# Patient Record
Sex: MALE | Race: WHITE | NOT HISPANIC OR LATINO | Employment: FULL TIME | URBAN - METROPOLITAN AREA
[De-identification: names, ages, dates, MRNs, and addresses within clinical notes are randomized per-mention and may not be internally consistent; named-entity substitution may affect disease eponyms.]

---

## 2017-02-24 ENCOUNTER — TELEPHONE (OUTPATIENT)
Dept: ORTHOPEDICS | Facility: CLINIC | Age: 42
End: 2017-02-24

## 2017-02-24 NOTE — TELEPHONE ENCOUNTER
----- Message from Zoila Serrano sent at 2/24/2017 10:21 AM CST -----  Contact: Self  X   1st Request  _  2nd Request  _  3rd Request        Who: TREMAYNE SHER [[id number]]    Why: Pt broke his index finger on the rt hand and went to Urgent Care. Pt feels the need to be seen today. Please call, thanks!    What Number to Call Back:  [phone]    When to Expect a call back: (Before the end of the day)   -- if the call is after 12:00, the call back will be tomorrow.      l

## 2020-07-04 ENCOUNTER — HOSPITAL ENCOUNTER (EMERGENCY)
Facility: HOSPITAL | Age: 45
Discharge: HOME | End: 2020-07-04
Attending: EMERGENCY MEDICINE
Payer: COMMERCIAL

## 2020-07-04 VITALS
TEMPERATURE: 99 F | HEIGHT: 70 IN | BODY MASS INDEX: 32.93 KG/M2 | HEART RATE: 70 BPM | RESPIRATION RATE: 20 BRPM | SYSTOLIC BLOOD PRESSURE: 128 MMHG | WEIGHT: 230 LBS | OXYGEN SATURATION: 100 % | DIASTOLIC BLOOD PRESSURE: 82 MMHG

## 2020-07-04 DIAGNOSIS — R07.89 CHEST WALL PAIN: ICD-10-CM

## 2020-07-04 DIAGNOSIS — R07.9 CHEST PAIN: Primary | ICD-10-CM

## 2020-07-04 LAB
ALBUMIN SERPL BCP-MCNC: 3.9 G/DL (ref 3.5–5.2)
ALP SERPL-CCNC: 86 U/L (ref 55–135)
ALT SERPL W/O P-5'-P-CCNC: 20 U/L (ref 10–44)
ANION GAP SERPL CALC-SCNC: 11 MMOL/L (ref 8–16)
AST SERPL-CCNC: 21 U/L (ref 10–40)
BASOPHILS # BLD AUTO: 0.04 K/UL (ref 0–0.2)
BASOPHILS NFR BLD: 0.7 % (ref 0–1.9)
BILIRUB SERPL-MCNC: 1.1 MG/DL (ref 0.1–1)
BNP SERPL-MCNC: 19 PG/ML (ref 0–99)
BUN SERPL-MCNC: 10 MG/DL (ref 6–20)
CALCIUM SERPL-MCNC: 8.7 MG/DL (ref 8.7–10.5)
CHLORIDE SERPL-SCNC: 107 MMOL/L (ref 95–110)
CO2 SERPL-SCNC: 24 MMOL/L (ref 23–29)
CREAT SERPL-MCNC: 0.8 MG/DL (ref 0.5–1.4)
DIFFERENTIAL METHOD: ABNORMAL
EOSINOPHIL # BLD AUTO: 0.1 K/UL (ref 0–0.5)
EOSINOPHIL NFR BLD: 1.1 % (ref 0–8)
ERYTHROCYTE [DISTWIDTH] IN BLOOD BY AUTOMATED COUNT: 12.9 % (ref 11.5–14.5)
EST. GFR  (AFRICAN AMERICAN): >60 ML/MIN/1.73 M^2
EST. GFR  (NON AFRICAN AMERICAN): >60 ML/MIN/1.73 M^2
GLUCOSE SERPL-MCNC: 87 MG/DL (ref 70–110)
HCT VFR BLD AUTO: 38.9 % (ref 40–54)
HGB BLD-MCNC: 13.3 G/DL (ref 14–18)
IMM GRANULOCYTES # BLD AUTO: 0.02 K/UL (ref 0–0.04)
IMM GRANULOCYTES NFR BLD AUTO: 0.3 % (ref 0–0.5)
INR PPP: 1.1
LYMPHOCYTES # BLD AUTO: 1.3 K/UL (ref 1–4.8)
LYMPHOCYTES NFR BLD: 21.2 % (ref 18–48)
MCH RBC QN AUTO: 29 PG (ref 27–31)
MCHC RBC AUTO-ENTMCNC: 34.2 G/DL (ref 32–36)
MCV RBC AUTO: 85 FL (ref 82–98)
MONOCYTES # BLD AUTO: 0.5 K/UL (ref 0.3–1)
MONOCYTES NFR BLD: 8 % (ref 4–15)
NEUTROPHILS # BLD AUTO: 4.2 K/UL (ref 1.8–7.7)
NEUTROPHILS NFR BLD: 68.7 % (ref 38–73)
NRBC BLD-RTO: 0 /100 WBC
PLATELET # BLD AUTO: 190 K/UL (ref 150–350)
PMV BLD AUTO: 11.2 FL (ref 9.2–12.9)
POTASSIUM SERPL-SCNC: 3.6 MMOL/L (ref 3.5–5.1)
PROT SERPL-MCNC: 6.5 G/DL (ref 6–8.4)
PROTHROMBIN TIME: 13.9 SEC (ref 10.6–14.8)
RBC # BLD AUTO: 4.59 M/UL (ref 4.6–6.2)
SODIUM SERPL-SCNC: 142 MMOL/L (ref 136–145)
TROPONIN I SERPL DL<=0.01 NG/ML-MCNC: <0.03 NG/ML
WBC # BLD AUTO: 6.13 K/UL (ref 3.9–12.7)

## 2020-07-04 PROCEDURE — 99285 EMERGENCY DEPT VISIT HI MDM: CPT | Mod: 25

## 2020-07-04 PROCEDURE — 84484 ASSAY OF TROPONIN QUANT: CPT

## 2020-07-04 PROCEDURE — 25000003 PHARM REV CODE 250: Performed by: EMERGENCY MEDICINE

## 2020-07-04 PROCEDURE — 63600175 PHARM REV CODE 636 W HCPCS: Performed by: EMERGENCY MEDICINE

## 2020-07-04 PROCEDURE — 96374 THER/PROPH/DIAG INJ IV PUSH: CPT

## 2020-07-04 PROCEDURE — 85025 COMPLETE CBC W/AUTO DIFF WBC: CPT

## 2020-07-04 PROCEDURE — 85610 PROTHROMBIN TIME: CPT

## 2020-07-04 PROCEDURE — 83880 ASSAY OF NATRIURETIC PEPTIDE: CPT

## 2020-07-04 PROCEDURE — 80053 COMPREHEN METABOLIC PANEL: CPT

## 2020-07-04 PROCEDURE — 93005 ELECTROCARDIOGRAM TRACING: CPT | Performed by: INTERNAL MEDICINE

## 2020-07-04 RX ORDER — METHOCARBAMOL 500 MG/1
1000 TABLET, FILM COATED ORAL
Status: COMPLETED | OUTPATIENT
Start: 2020-07-04 | End: 2020-07-04

## 2020-07-04 RX ORDER — METHOCARBAMOL 500 MG/1
1000 TABLET, FILM COATED ORAL 3 TIMES DAILY
Qty: 30 TABLET | Refills: 0 | Status: SHIPPED | OUTPATIENT
Start: 2020-07-04 | End: 2020-07-09

## 2020-07-04 RX ORDER — IBUPROFEN 600 MG/1
600 TABLET ORAL EVERY 6 HOURS PRN
Qty: 20 TABLET | Refills: 0 | Status: SHIPPED | OUTPATIENT
Start: 2020-07-04 | End: 2022-05-09

## 2020-07-04 RX ORDER — KETOROLAC TROMETHAMINE 30 MG/ML
15 INJECTION, SOLUTION INTRAMUSCULAR; INTRAVENOUS
Status: COMPLETED | OUTPATIENT
Start: 2020-07-04 | End: 2020-07-04

## 2020-07-04 RX ADMIN — KETOROLAC TROMETHAMINE 15 MG: 30 INJECTION, SOLUTION INTRAMUSCULAR at 07:07

## 2020-07-04 RX ADMIN — METHOCARBAMOL TABLETS 1000 MG: 500 TABLET, COATED ORAL at 07:07

## 2020-07-04 NOTE — ED PROVIDER NOTES
Encounter Date: 7/4/2020       History     Chief Complaint   Patient presents with    Chest Pain     THIS AM     45-year-old male past medical history significant of GERD presents with chest pain x1 day.  Patient states on last night he had a few drinks and stumbled landing on his left shoulder.  He states after they event around 2:00 a.m. he woke up with severe chest pain located on the left side of his chest wall with no radiation.  Patient describes the pain as crampy in character and exacerbated by certain movements as well as tender to touch.  Patient denies any nausea, vomiting, shortness of breath, fever, chills, sweats.  He is otherwise stable and has no other complaints.        Review of patient's allergies indicates:  No Known Allergies  Past Medical History:   Diagnosis Date    GERD (gastroesophageal reflux disease)      Past Surgical History:   Procedure Laterality Date    FOOT FRACTURE SURGERY      grastric sleeve      iye       UMBILICAL HERNIA REPAIR  2007    incarcerated, at [facility]     Family History   Problem Relation Age of Onset    Diabetes Father     Heart disease Father         minor MI    Cancer Maternal Grandfather         throat    Urticaria Brother      Social History     Tobacco Use    Smoking status: Current Some Day Smoker     Packs/day: 0.30     Years: 22.00     Pack years: 6.60     Types: Cigarettes   Substance Use Topics    Alcohol use: Yes     Alcohol/week: 15.0 standard drinks     Types: 15 Cans of beer per week    Drug use: No     Review of Systems   Constitutional: Negative for fever.   HENT: Negative for sore throat.    Respiratory: Negative for shortness of breath.    Cardiovascular: Positive for chest pain.   Gastrointestinal: Negative for nausea.   Genitourinary: Negative for dysuria.   Musculoskeletal: Negative for back pain.   Skin: Negative for rash.   Neurological: Negative for weakness.   Hematological: Does not bruise/bleed easily.   All other systems  reviewed and are negative.      Physical Exam     Initial Vitals [07/04/20 0702]   BP Pulse Resp Temp SpO2   124/75 75 17 99 °F (37.2 °C) 99 %      MAP       --         Physical Exam    Nursing note and vitals reviewed.  Constitutional: He appears well-developed and well-nourished. He is not diaphoretic. No distress.   HENT:   Head: Normocephalic and atraumatic.   Mouth/Throat: Oropharynx is clear and moist.   Eyes: Conjunctivae and EOM are normal. Pupils are equal, round, and reactive to light.   Neck: Normal range of motion. Neck supple. No thyromegaly present. No JVD present.   Cardiovascular: Normal rate, regular rhythm and normal heart sounds. Exam reveals no gallop and no friction rub.    No murmur heard.  Pulmonary/Chest: Breath sounds normal. No respiratory distress. He has no wheezes. He exhibits tenderness.   Abdominal: Soft. Bowel sounds are normal. He exhibits no distension. There is no abdominal tenderness. There is no rebound and no guarding.   Musculoskeletal: Normal range of motion. No tenderness or edema.   Neurological: He is alert and oriented to person, place, and time. He has normal strength. No cranial nerve deficit or sensory deficit.   Skin: Skin is warm and dry. Capillary refill takes less than 2 seconds. No rash noted. No erythema.   Psychiatric: He has a normal mood and affect.         ED Course   Procedures  Labs Reviewed   CBC W/ AUTO DIFFERENTIAL - Abnormal; Notable for the following components:       Result Value    RBC 4.59 (*)     Hemoglobin 13.3 (*)     Hematocrit 38.9 (*)     All other components within normal limits   COMPREHENSIVE METABOLIC PANEL - Abnormal; Notable for the following components:    Total Bilirubin 1.1 (*)     All other components within normal limits   B-TYPE NATRIURETIC PEPTIDE   TROPONIN I   PROTIME-INR        ECG Results          EKG 12-lead (In process)  Result time 07/04/20 08:04:34    In process by Interface, Lab In [facility] (07/04/20 08:04:34)                  Narrative:    Test Reason : R07.9,    Vent. Rate : 075 BPM     Atrial Rate : 075 BPM     P-R Int : 156 ms          QRS Dur : 102 ms      QT Int : 406 ms       P-R-T Axes : 046 026 012 degrees     QTc Int : 453 ms    Program found technically poor ECG  Normal sinus rhythm with sinus arrhythmia  Normal ECG  No previous ECGs available    Referred By: [email or web address]   SELF           Confirmed By:                             Imaging Results          X-Ray Chest AP Portable (Final result)  Result time 07/04/20 07:37:11    Final result by Petr Trammell MD (07/04/20 07:37:11)                 Narrative:    CLINICAL HISTORY:  45 years (1975) Male CHEST PAIN Chest Pain?(THIS AM)    TECHNIQUE:  Portable AP radiograph the chest.    COMPARISON:  None available.    FINDINGS:  The lungs are clear. Costophrenic angles are seen without effusion. No  pneumothorax is identified. The heart is top normal in size. The  mediastinum is within normal limits. Osseous structures show  degenerative changes in the spine. The upper abdomen shows a small  size hiatal hernia.    IMPRESSION:  No acute cardiac or pulmonary process.                  .            Electronically Signed by WM Bates on 7/4/2020 7:36 AM                               Medical Decision Making:   Initial Assessment:   [age] year male initial assessment in no acute distress at this time.  Patient is alert oriented x3, neurologically and neurovascular intact no focal deficits.  He is nontoxic appearing vital stable at this time.  Differential Diagnosis:   MI, GERD, MSK chest wall pain, pericarditis, pneumonia  Independently Interpreted Test(s):   I have ordered and independently interpreted X-rays - see prior notes.  I have ordered and independently interpreted EKG Reading(s) - see prior notes  Clinical Tests:   Lab Tests: Ordered and Reviewed  The following lab test(s) were unremarkable: CBC, CMP and Troponin  Radiological Study: Reviewed and  Ordered  Medical Tests: Ordered and Reviewed  ED Management:  Patient has been reassessed noted to have no acute changes condition.  Labs images unremarkable for any acute pathology requiring further hospital admission or treatment at this time.  He has remained stable while in the ED and discharged home stable condition with PCP follow-up as needed.  Mr. Richards is aware of the plan and in agreement with discharge.    Additional MDM:   Heart Score:    History:          Slightly suspicious.  ECG:             Normal  Age:               45-65 years  Risk factors: 1-2 risk factors  Troponin:       Less than or equal to normal limit  Final Score: 2                                  Clinical Impression:       ICD-10-CM ICD-9-CM   1. Chest pain  R07.9 786.50   2. Chest wall pain  R07.89 786.52             ED Disposition Condition    Discharge Stable        ED Prescriptions     Medication Sig Dispense Start Date End Date Auth. Provider    methocarbamoL (ROBAXIN) 500 MG Tab Take 2 tablets (1,000 mg total) by mouth 3 (three) times daily. for 5 days 30 tablet 7/4/2020 7/9/2020 Zay Spencer MD    ibuprofen (ADVIL,MOTRIN) 600 MG tablet Take 1 tablet (600 mg total) by mouth every 6 (six) hours as needed. 20 tablet 7/4/2020  Zay Spencer MD        Follow-up Information     Follow up With Specialties Details Why Contact Info Additional Information    [facility] Emergency Medicine  As needed, If symptoms worsen [address]  [facility] [id number] [phone] 1st floor    Ant Maier MD Internal Medicine Schedule an appointment as soon as possible for a visit in 3 days As needed [address]  [address]  [facility] [id number] [phone]                                        Zay Spencer MD  07/04/20 0996

## 2021-04-16 ENCOUNTER — PATIENT MESSAGE (OUTPATIENT)
Dept: RESEARCH | Facility: HOSPITAL | Age: 46
End: 2021-04-16

## 2021-10-27 PROBLEM — Z98.84 S/P BARIATRIC SURGERY: Status: ACTIVE | Noted: 2018-07-17

## 2021-10-27 PROBLEM — Z83.3 FAMILY HISTORY OF DIABETES MELLITUS: Status: ACTIVE | Noted: 2018-07-20

## 2021-11-04 ENCOUNTER — OFFICE VISIT (OUTPATIENT)
Dept: UROLOGY | Facility: CLINIC | Age: 46
End: 2021-11-04
Payer: COMMERCIAL

## 2021-11-04 VITALS
BODY MASS INDEX: 33.21 KG/M2 | SYSTOLIC BLOOD PRESSURE: 132 MMHG | DIASTOLIC BLOOD PRESSURE: 84 MMHG | HEIGHT: 70 IN | HEART RATE: 79 BPM | WEIGHT: 231.94 LBS

## 2021-11-04 DIAGNOSIS — Z30.09 VASECTOMY EVALUATION: ICD-10-CM

## 2021-11-04 PROCEDURE — 3079F PR MOST RECENT DIASTOLIC BLOOD PRESSURE 80-89 MM HG: ICD-10-PCS | Mod: CPTII,S$GLB,, | Performed by: UROLOGY

## 2021-11-04 PROCEDURE — 3079F DIAST BP 80-89 MM HG: CPT | Mod: CPTII,S$GLB,, | Performed by: UROLOGY

## 2021-11-04 PROCEDURE — 1159F MED LIST DOCD IN RCRD: CPT | Mod: CPTII,S$GLB,, | Performed by: UROLOGY

## 2021-11-04 PROCEDURE — 3075F SYST BP GE 130 - 139MM HG: CPT | Mod: CPTII,S$GLB,, | Performed by: UROLOGY

## 2021-11-04 PROCEDURE — 99999 PR PBB SHADOW E&M-EST. PATIENT-LVL III: ICD-10-PCS | Mod: PBBFAC,,, | Performed by: UROLOGY

## 2021-11-04 PROCEDURE — 99204 OFFICE O/P NEW MOD 45 MIN: CPT | Mod: S$GLB,,, | Performed by: UROLOGY

## 2021-11-04 PROCEDURE — 3075F PR MOST RECENT SYSTOLIC BLOOD PRESS GE 130-139MM HG: ICD-10-PCS | Mod: CPTII,S$GLB,, | Performed by: UROLOGY

## 2021-11-04 PROCEDURE — 99999 PR PBB SHADOW E&M-EST. PATIENT-LVL III: CPT | Mod: PBBFAC,,, | Performed by: UROLOGY

## 2021-11-04 PROCEDURE — 1159F PR MEDICATION LIST DOCUMENTED IN MEDICAL RECORD: ICD-10-PCS | Mod: CPTII,S$GLB,, | Performed by: UROLOGY

## 2021-11-04 PROCEDURE — 3008F PR BODY MASS INDEX (BMI) DOCUMENTED: ICD-10-PCS | Mod: CPTII,S$GLB,, | Performed by: UROLOGY

## 2021-11-04 PROCEDURE — 99204 PR OFFICE/OUTPT VISIT, NEW, LEVL IV, 45-59 MIN: ICD-10-PCS | Mod: S$GLB,,, | Performed by: UROLOGY

## 2021-11-04 PROCEDURE — 3008F BODY MASS INDEX DOCD: CPT | Mod: CPTII,S$GLB,, | Performed by: UROLOGY

## 2021-11-04 RX ORDER — DIAZEPAM 10 MG/1
TABLET ORAL
Qty: 2 TABLET | Refills: 0 | Status: SHIPPED | OUTPATIENT
Start: 2021-11-04 | End: 2022-02-02

## 2021-12-21 ENCOUNTER — PROCEDURE VISIT (OUTPATIENT)
Dept: UROLOGY | Facility: CLINIC | Age: 46
End: 2021-12-21
Payer: COMMERCIAL

## 2021-12-21 VITALS
WEIGHT: 225 LBS | TEMPERATURE: 98 F | HEIGHT: 70 IN | DIASTOLIC BLOOD PRESSURE: 87 MMHG | SYSTOLIC BLOOD PRESSURE: 135 MMHG | BODY MASS INDEX: 32.21 KG/M2 | HEART RATE: 82 BPM | RESPIRATION RATE: 16 BRPM

## 2021-12-21 DIAGNOSIS — Z30.09 VASECTOMY EVALUATION: ICD-10-CM

## 2021-12-21 RX ORDER — LIDOCAINE HYDROCHLORIDE 10 MG/ML
20 INJECTION INFILTRATION; PERINEURAL
Status: COMPLETED | OUTPATIENT
Start: 2021-12-21 | End: 2021-12-21

## 2021-12-21 RX ADMIN — LIDOCAINE HYDROCHLORIDE 20 ML: 10 INJECTION INFILTRATION; PERINEURAL at 02:12

## 2021-12-21 NOTE — PROCEDURES
Procedures     Preop Diagnosis: undesired fertility    Post Procedure Diagnosed: same    Procedure: bilateral vasectomy    Surgeon: Dr. Sree Roque MD    Anesthesia: 4cc of 1% plain zylocaine    Procedure in detail: After informed consent was obtained, the patient was shaved, prepped and draped in a sterile fashion. He was in the supine position. The left vas was identified and rotated to right of the mid-line. Infiltration of 2cc of zylocaine was made into the skin subcutaneous tissue, vas and per-vasal tissues. A stab wound was made. A 2.5cm segment was delivered to the wound, excised, fulgrated and doubly ligated and folded back upon itself and buried separately. A similar procedure was performed on the right side through the same incision. A 3.0 chromic was used throughout the procedure and to close the skin in an interupted fashion.     Blood loss was minimal.   Usual post vas precautions were given.   Prescriptions for pain meds given.     No sex or aspirin for the first week.   15-20 ejaculation with protection over the next 6 weeks and patient will bring a post vas specimen in 6 weeks or call sooner prn.

## 2021-12-21 NOTE — PATIENT INSTRUCTIONS
What to Expect After a Vasectomy  You cannot drive or operate heavy machinery on the day of the procedure.    Apply ice packs to the scrotal area for 24-48 hours. Avoid direct contact of the ice pack with the skin. Scrotal supports, jock straps, or fitted underwear help elevate the scrotum and reduce discomfort.    You may shower the next day. Gently apply soapy water to the scrotum to wash. Rinse and dry yourself by blotting the skin, not rubbing.    Avoid strenuous physical exercises or sexual relations for at least one week after a vasectomy.    Continue to use birth control for at least 6 weeks or 10-20 ejaculations. You are still considered fertile until your urologist examines a post-vasectomy semen analysis at 6 weeks and perhaps one at 8 weeks as well. Drop off the specimen at the 4th floor [facility] between 8am and 4pm.    Do NOT resume unprotected sexual activity until your physician finds no sperm in your semen.    All stitches will dissolve on their own in 1-2 weeks.    Signs and Symptoms to Report  · A large amount of bleeding at the site  · An unusual amount of pain  · A large amount of swelling in the scrotum  · Fever and chills  · Any signs of infection, such as redness at the site or foul-smelling drainage    Risks  The risks of complication after vasectomy are very low. A few of the risks include:  · Bleeding  · Infection  · Scrotal hematoma - a collection of blood in the scrotum  · Inflammation of the epididymis - inflammation of a structure next to the testicle that helps in maturation of sperm  · Sperm granuloma - a collection of sperm that leaks out from the vas deferens, forming a small nodule or lump. This does not usually cause any discomfort but you may feel it in the scrotum.  · Recanalization - the restoration of the lumen or transport tube between the two ends of the vas deferens, possibly causing fertility    If you have any questions or concerns, please call your [facility]  urologist at [phone].

## 2021-12-24 ENCOUNTER — HOSPITAL ENCOUNTER (EMERGENCY)
Facility: OTHER | Age: 46
Discharge: HOME | End: 2021-12-24
Attending: EMERGENCY MEDICINE
Payer: COMMERCIAL

## 2021-12-24 ENCOUNTER — PATIENT MESSAGE (OUTPATIENT)
Dept: ADMINISTRATIVE | Facility: OTHER | Age: 46
End: 2021-12-24
Payer: COMMERCIAL

## 2021-12-24 ENCOUNTER — NURSE TRIAGE (OUTPATIENT)
Dept: ADMINISTRATIVE | Facility: CLINIC | Age: 46
End: 2021-12-24
Payer: COMMERCIAL

## 2021-12-24 VITALS
DIASTOLIC BLOOD PRESSURE: 105 MMHG | WEIGHT: 227.5 LBS | TEMPERATURE: 100 F | RESPIRATION RATE: 20 BRPM | BODY MASS INDEX: 31.85 KG/M2 | HEIGHT: 71 IN | OXYGEN SATURATION: 100 % | SYSTOLIC BLOOD PRESSURE: 171 MMHG | HEART RATE: 92 BPM

## 2021-12-24 DIAGNOSIS — A41.9 SEPSIS: ICD-10-CM

## 2021-12-24 DIAGNOSIS — Z98.890 POST-OPERATIVE STATE: ICD-10-CM

## 2021-12-24 DIAGNOSIS — R50.9 FEVER, UNSPECIFIED FEVER CAUSE: Primary | ICD-10-CM

## 2021-12-24 LAB
ALBUMIN SERPL BCP-MCNC: 3.9 G/DL (ref 3.5–5.2)
ALP SERPL-CCNC: 94 U/L (ref 55–135)
ALT SERPL W/O P-5'-P-CCNC: 11 U/L (ref 10–44)
ANION GAP SERPL CALC-SCNC: 12 MMOL/L (ref 8–16)
AST SERPL-CCNC: 18 U/L (ref 10–40)
BASOPHILS # BLD AUTO: 0.03 K/UL (ref 0–0.2)
BASOPHILS NFR BLD: 0.4 % (ref 0–1.9)
BILIRUB SERPL-MCNC: 0.6 MG/DL (ref 0.1–1)
BILIRUB UR QL STRIP: NEGATIVE
BUN SERPL-MCNC: 7 MG/DL (ref 6–20)
CALCIUM SERPL-MCNC: 9.2 MG/DL (ref 8.7–10.5)
CHLORIDE SERPL-SCNC: 103 MMOL/L (ref 95–110)
CLARITY UR: CLEAR
CO2 SERPL-SCNC: 26 MMOL/L (ref 23–29)
COLOR UR: YELLOW
CREAT SERPL-MCNC: 1 MG/DL (ref 0.5–1.4)
CTP QC/QA: YES
CTP QC/QA: YES
DIFFERENTIAL METHOD: ABNORMAL
EOSINOPHIL # BLD AUTO: 0 K/UL (ref 0–0.5)
EOSINOPHIL NFR BLD: 0.3 % (ref 0–8)
ERYTHROCYTE [DISTWIDTH] IN BLOOD BY AUTOMATED COUNT: 12.2 % (ref 11.5–14.5)
EST. GFR  (AFRICAN AMERICAN): >60 ML/MIN/1.73 M^2
EST. GFR  (NON AFRICAN AMERICAN): >60 ML/MIN/1.73 M^2
GLUCOSE SERPL-MCNC: 91 MG/DL (ref 70–110)
GLUCOSE UR QL STRIP: NEGATIVE
HCT VFR BLD AUTO: 41 % (ref 40–54)
HGB BLD-MCNC: 14 G/DL (ref 14–18)
HGB UR QL STRIP: NEGATIVE
IMM GRANULOCYTES # BLD AUTO: 0.02 K/UL (ref 0–0.04)
IMM GRANULOCYTES NFR BLD AUTO: 0.3 % (ref 0–0.5)
KETONES UR QL STRIP: NEGATIVE
LACTATE SERPL-SCNC: 0.9 MMOL/L (ref 0.5–2.2)
LEUKOCYTE ESTERASE UR QL STRIP: NEGATIVE
LYMPHOCYTES # BLD AUTO: 1 K/UL (ref 1–4.8)
LYMPHOCYTES NFR BLD: 13.9 % (ref 18–48)
MCH RBC QN AUTO: 29.2 PG (ref 27–31)
MCHC RBC AUTO-ENTMCNC: 34.1 G/DL (ref 32–36)
MCV RBC AUTO: 85 FL (ref 82–98)
MONOCYTES # BLD AUTO: 0.8 K/UL (ref 0.3–1)
MONOCYTES NFR BLD: 11 % (ref 4–15)
NEUTROPHILS # BLD AUTO: 5.5 K/UL (ref 1.8–7.7)
NEUTROPHILS NFR BLD: 74.1 % (ref 38–73)
NITRITE UR QL STRIP: NEGATIVE
NRBC BLD-RTO: 0 /100 WBC
PH UR STRIP: 6 [PH] (ref 5–8)
PLATELET # BLD AUTO: 177 K/UL (ref 150–450)
PMV BLD AUTO: 10.3 FL (ref 9.2–12.9)
POC MOLECULAR INFLUENZA A AGN: NEGATIVE
POC MOLECULAR INFLUENZA B AGN: NEGATIVE
POTASSIUM SERPL-SCNC: 3.7 MMOL/L (ref 3.5–5.1)
PROT SERPL-MCNC: 6.8 G/DL (ref 6–8.4)
PROT UR QL STRIP: NEGATIVE
RBC # BLD AUTO: 4.8 M/UL (ref 4.6–6.2)
SARS-COV-2 RDRP RESP QL NAA+PROBE: NEGATIVE
SODIUM SERPL-SCNC: 141 MMOL/L (ref 136–145)
SP GR UR STRIP: <=1.005 (ref 1–1.03)
URN SPEC COLLECT METH UR: ABNORMAL
UROBILINOGEN UR STRIP-ACNC: NEGATIVE EU/DL
WBC # BLD AUTO: 7.36 K/UL (ref 3.9–12.7)

## 2021-12-24 PROCEDURE — 99285 EMERGENCY DEPT VISIT HI MDM: CPT | Mod: 25

## 2021-12-24 PROCEDURE — 25000003 PHARM REV CODE 250: Performed by: EMERGENCY MEDICINE

## 2021-12-24 PROCEDURE — 80053 COMPREHEN METABOLIC PANEL: CPT | Performed by: EMERGENCY MEDICINE

## 2021-12-24 PROCEDURE — 93005 ELECTROCARDIOGRAM TRACING: CPT

## 2021-12-24 PROCEDURE — 63600175 PHARM REV CODE 636 W HCPCS: Performed by: EMERGENCY MEDICINE

## 2021-12-24 PROCEDURE — 85025 COMPLETE CBC W/AUTO DIFF WBC: CPT | Performed by: EMERGENCY MEDICINE

## 2021-12-24 PROCEDURE — 81003 URINALYSIS AUTO W/O SCOPE: CPT | Performed by: EMERGENCY MEDICINE

## 2021-12-24 PROCEDURE — U0002 COVID-19 LAB TEST NON-CDC: HCPCS | Performed by: EMERGENCY MEDICINE

## 2021-12-24 PROCEDURE — 96374 THER/PROPH/DIAG INJ IV PUSH: CPT

## 2021-12-24 PROCEDURE — 83605 ASSAY OF LACTIC ACID: CPT | Performed by: EMERGENCY MEDICINE

## 2021-12-24 PROCEDURE — 93010 ELECTROCARDIOGRAM REPORT: CPT | Mod: ,,, | Performed by: INTERNAL MEDICINE

## 2021-12-24 PROCEDURE — 93010 EKG 12-LEAD: ICD-10-PCS | Mod: ,,, | Performed by: INTERNAL MEDICINE

## 2021-12-24 RX ORDER — ACETAMINOPHEN 500 MG
1000 TABLET ORAL
Status: COMPLETED | OUTPATIENT
Start: 2021-12-24 | End: 2021-12-24

## 2021-12-24 RX ORDER — KETOROLAC TROMETHAMINE 30 MG/ML
15 INJECTION, SOLUTION INTRAMUSCULAR; INTRAVENOUS
Status: COMPLETED | OUTPATIENT
Start: 2021-12-24 | End: 2021-12-24

## 2021-12-24 RX ADMIN — ACETAMINOPHEN 1000 MG: 500 TABLET, FILM COATED ORAL at 09:12

## 2021-12-24 RX ADMIN — KETOROLAC TROMETHAMINE 15 MG: 30 INJECTION, SOLUTION INTRAMUSCULAR; INTRAVENOUS at 09:12

## 2021-12-25 NOTE — ED PROVIDER NOTES
"Encounter Date: 12/24/2021    SCRIBE #1 NOTE: I, Vandana Bailey, am scribing for, and in the presence of, Bell Sahni MD.       History     Chief Complaint   Patient presents with    Fever     Had a vasectomy 3 days ago, thinks he "overdid" it today. Was running around getting ready for X-mas. Has minor swelling, no drainage, and soreness, denies body aches, N/V/D, cough     Time seen by provider: 9:20 PM    This is a 46 y.o. male who presents with complaint of fever onset this evening. He was preparing for [place] today and thinks he may have "overdid" it. He measured his tempeture to be 102 F at home. Patient decided to come ER for evaluations because he was concerned for COVID-19. He has been vaccinated against COVID-19. patient had a vasectomy that was done by Dr. Roque last Tuesday. He followed up the post-op instructions. He notes "some" testicular pain. However, he has not had medication for pain management since noon today. He normally takes Ibuprofen 600 which helps with his post-up pain.     The history is provided by the patient.     Review of patient's allergies indicates:  No Known Allergies  Past Medical History:   Diagnosis Date    GERD (gastroesophageal reflux disease)      Past Surgical History:   Procedure Laterality Date    FOOT FRACTURE SURGERY      grastric sleeve      iye       UMBILICAL HERNIA REPAIR  2007    incarcerated, at [facility]     Family History   Problem Relation Age of Onset    Diabetes Father     Heart disease Father         minor MI    Cancer Maternal Grandfather         throat    Urticaria Brother      Social History     Tobacco Use    Smoking status: Current Some Day Smoker     Packs/day: 0.30     Years: 22.00     Pack years: 6.60     Types: Cigarettes    Smokeless tobacco: Never Used   Substance Use Topics    Alcohol use: Yes     Alcohol/week: 15.0 standard drinks     Types: 15 Cans of beer per week    Drug use: No     Review of Systems "   Constitutional: Positive for fever. Negative for chills.   HENT: Negative for congestion and sore throat.    Eyes: Negative for photophobia and redness.   Respiratory: Negative for cough and shortness of breath.    Cardiovascular: Negative for chest pain.   Gastrointestinal: Negative for abdominal pain, nausea and vomiting.   Genitourinary: Positive for testicular pain. Negative for dysuria.   Musculoskeletal: Negative for back pain.   Skin: Negative for rash.   Neurological: Negative for weakness, light-headedness and headaches.   Psychiatric/Behavioral: Negative for confusion.       Physical Exam     Initial Vitals [12/24/21 1919]   BP Pulse Resp Temp SpO2   (!) 171/105 92 20 (!) 101.9 °F (38.8 °C) 100 %      MAP       --         Physical Exam    Nursing note and vitals reviewed.  Constitutional: He appears well-developed. He is cooperative.   HENT:   Head: Atraumatic.   Eyes: Conjunctivae and lids are normal.   Neck:   Normal range of motion.  Cardiovascular: Normal rate.   Pulmonary/Chest: Breath sounds normal.   Abdominal: Abdomen is soft. There is no abdominal tenderness.   Genitourinary:    Penis normal.      Genitourinary Comments: Suture in place to right scrotum- no focal swelling, erythema, tenderness     Musculoskeletal:         General: Normal range of motion.      Cervical back: Normal range of motion.     Neurological: He is alert and oriented to person, place, and time.   Skin: No rash noted.   Psychiatric: He has a normal mood and affect. His speech is normal and behavior is normal.         ED Course   Procedures  Labs Reviewed   CBC W/ AUTO DIFFERENTIAL - Abnormal; Notable for the following components:       Result Value    Gran % 74.1 (*)     Lymph % 13.9 (*)     All other components within normal limits   URINALYSIS, REFLEX TO URINE CULTURE - Abnormal; Notable for the following components:    Specific Gravity, UA <=1.005 (*)     All other components within normal limits    Narrative:      Specimen Source->Urine   COMPREHENSIVE METABOLIC PANEL   LACTIC ACID, PLASMA   SARS-COV-2 RDRP GENE    Narrative:     This test utilizes isothermal nucleic acid amplification   technology to detect the SARS-CoV-2 RdRp nucleic acid segment.   The analytical sensitivity (limit of detection) is 125 genome   equivalents/mL.   A POSITIVE result implies infection with the SARS-CoV-2 virus;   the patient is presumed to be contagious.     A NEGATIVE result means that SARS-CoV-2 nucleic acids are not   present above the limit of detection. A NEGATIVE result should be   treated as presumptive. It does not rule out the possibility of   COVID-19 and should not be the sole basis for treatment decisions.   If COVID-19 is strongly suspected based on clinical and exposure   history, re-testing using an alternate molecular assay should be   considered.   This test is only for use under the Food and Drug   Administration s Emergency Use Authorization (EUA).   Commercial kits are provided by [employer].   Performance characteristics of the EUA have been independently   verified by [facility] Department of   Pathology and Laboratory Medicine.   _________________________________________________________________   The authorized Fact Sheet for Healthcare Providers and the authorized Fact   Sheet for Patients of the ID NOW COVID-19 are available on the FDA   website:     https://www.fda.gov/media/619863/download  https://www.fda.gov/media/034886/download         POCT INFLUENZA A/B MOLECULAR     EKG Readings: (Independently Interpreted)   Sinus arrhythmia. Rate of 84. Narrow QRS. No STEMI.        Imaging Results          X-Ray Chest AP Portable (Final result)  Result time 12/24/21 21:18:39    Final result by Rosemary Barnes MD (12/24/21 21:18:39)                 Impression:      No acute intrathoracic abnormality detected.      Electronically signed by: Rosemary Barnes  Date:    12/24/2021  Time:    21:18              Narrative:    EXAMINATION:  AP PORTABLE CHEST    CLINICAL HISTORY:  Sepsis;    TECHNIQUE:  AP portable chest radiograph was submitted.    COMPARISON:  07/04/2020    FINDINGS:  AP portable chest radiograph demonstrates a cardiac silhouette within normal limits.  There is no focal consolidation, pneumothorax, or pleural effusion.  There is mild lingular scarring or atelectasis.  Spondylitic changes are present                              X-Rays:   Independently Interpreted Readings:   Chest X-Ray: No focal infiltrate. No cardiomegaly. No pneumothorax.      Medications   acetaminophen tablet 1,000 mg (1,000 mg Oral Given 12/24/21 2115)   ketorolac injection 15 mg (15 mg Intravenous Given 12/24/21 2120)     Medical Decision Making:   History:   Old Medical Records: I decided to obtain old medical records.  Initial Assessment:   46-year-old gentleman here with concern for fever, developed this evening.  Patient is 3 days postop from vasectomy by Dr. Roque. Currently denies dysuria, mild dull pain to the testicles, no cough. Daughter has uri symptoms and patient is fully vaccinated against covid- no known close contacts. Here pt well appearing, non toxic, febrile 101.9- no hypoxia- gu exam without wound dehisence or scrotal tenderness. Initial sepsis workup held given isolated fever- covid neg. Will obtain labs, admin antipyretics, suggest repeat covid testing if fever persists.     Independently Interpreted Test(s):   I have ordered and independently interpreted X-rays - see prior notes.  I have ordered and independently interpreted EKG Reading(s) - see prior notes  Clinical Tests:   Lab Tests: Ordered and Reviewed  Radiological Study: Ordered and Reviewed  Medical Tests: Ordered and Reviewed          Scribe Attestation:   Scribe #1: I performed the above scribed service and the documentation accurately describes the services I performed. I attest to the accuracy of the note.         Physician Attestation for Scribe: I,  Bora, reviewed documentation as scribed in my presence, which is both accurate and complete.  ED Course as of 12/24/21 2359   Fri Dec 24, 2021   2044 CBC auto differential [RC]      ED Course User Index  [RC] Jorge Anaya MD             Clinical Impression:   Final diagnoses:  [A41.9] Sepsis  [R50.9] Fever, unspecified fever cause (Primary)  [Z98.890] Post-operative state          ED Disposition Condition    Discharge Stable        ED Prescriptions     None        Follow-up Information     Follow up With Specialties Details Why Contact Info    Sree Roque Jr., MD Urology Call in 2 days If symptoms worsen [address] [address]  [phone]             Bell Sahni MD  12/24/21 3447

## 2021-12-28 ENCOUNTER — PATIENT MESSAGE (OUTPATIENT)
Dept: UROLOGY | Facility: CLINIC | Age: 46
End: 2021-12-28
Payer: COMMERCIAL

## 2021-12-30 ENCOUNTER — HOSPITAL ENCOUNTER (EMERGENCY)
Facility: HOSPITAL | Age: 46
Discharge: HOME | End: 2021-12-30
Attending: EMERGENCY MEDICINE
Payer: COMMERCIAL

## 2021-12-30 ENCOUNTER — TELEPHONE (OUTPATIENT)
Dept: UROLOGY | Facility: CLINIC | Age: 46
End: 2021-12-30
Payer: COMMERCIAL

## 2021-12-30 VITALS
SYSTOLIC BLOOD PRESSURE: 143 MMHG | DIASTOLIC BLOOD PRESSURE: 94 MMHG | RESPIRATION RATE: 18 BRPM | OXYGEN SATURATION: 99 % | HEART RATE: 86 BPM | TEMPERATURE: 99 F

## 2021-12-30 DIAGNOSIS — N50.89 SWELLING OF LEFT TESTICLE: ICD-10-CM

## 2021-12-30 DIAGNOSIS — N50.812 PAIN IN LEFT TESTICLE: Primary | ICD-10-CM

## 2021-12-30 LAB
BILIRUB UR QL STRIP: NEGATIVE
CLARITY UR REFRACT.AUTO: CLEAR
COLOR UR AUTO: ABNORMAL
GLUCOSE UR QL STRIP: NEGATIVE
HGB UR QL STRIP: NEGATIVE
KETONES UR QL STRIP: ABNORMAL
LEUKOCYTE ESTERASE UR QL STRIP: NEGATIVE
NITRITE UR QL STRIP: NEGATIVE
PH UR STRIP: 6 [PH] (ref 5–8)
PROT UR QL STRIP: NEGATIVE
SP GR UR STRIP: 1 (ref 1–1.03)
URN SPEC COLLECT METH UR: ABNORMAL

## 2021-12-30 PROCEDURE — 99284 EMERGENCY DEPT VISIT MOD MDM: CPT | Mod: ,,, | Performed by: PHYSICIAN ASSISTANT

## 2021-12-30 PROCEDURE — 81003 URINALYSIS AUTO W/O SCOPE: CPT | Performed by: PHYSICIAN ASSISTANT

## 2021-12-30 PROCEDURE — 99284 PR EMERGENCY DEPT VISIT,LEVEL IV: ICD-10-PCS | Mod: ,,, | Performed by: PHYSICIAN ASSISTANT

## 2021-12-30 PROCEDURE — 25000003 PHARM REV CODE 250: Performed by: PHYSICIAN ASSISTANT

## 2021-12-30 PROCEDURE — 99284 EMERGENCY DEPT VISIT MOD MDM: CPT

## 2021-12-30 RX ORDER — ACETAMINOPHEN 500 MG
1000 TABLET ORAL
Status: COMPLETED | OUTPATIENT
Start: 2021-12-30 | End: 2021-12-30

## 2021-12-30 RX ADMIN — ACETAMINOPHEN 1000 MG: 500 TABLET ORAL at 02:12

## 2021-12-30 NOTE — ED NOTES
Patient identifiers verified and correct for Mr Richards  C/C: testicle pain and swelling SEE NN  APPEARANCE: awake and alert in NAD.  SKIN: warm, dry and intact. No breakdown or bruising.  MUSCULOSKELETAL: Patient moving all extremities spontaneously,testicle not observed at present. Ambulates independently.  RESPIRATORY: Denies shortness of breath.Respirations unlabored.   CARDIAC: Denies CP, 2+ distal pulses; no peripheral edema  ABDOMEN: S/ND/NT, Denies nausea  : voids spontaneously, denies difficulty  Neurologic: AAO x 4; follows commands equal strength in all extremities; denies numbness/tingling. Denies dizziness Denies weakness

## 2021-12-30 NOTE — ED NOTES
"Patient states left testicle swelling, noted at 0700 the size of "golf ball" currently increased in size. Denies fevers. Vasectomy 12/21, Ibuprofen at 0730  "

## 2021-12-30 NOTE — ED PROVIDER NOTES
Encounter Date: 12/30/2021       History     Chief Complaint   Patient presents with    Testicle Pain     Pt had a vasectomy on 12/21. L testicle is enlarged and painful.      46-year-old male with history of GERD presents to the ED complaining of left testicle pain and swelling.  He had a vasectomy on 12/21 with Dr Roque.  Yesterday, noticed some swelling to the left testicle that has been progressively worsening, significantly worsened this morning to the size of a golf ball.  He reports the pain has been worsening and is now a 5/10.  Denies any trauma.  He denies dysuria, hematuria, fever, chills, abdominal pain, chest pain.  He is not currently on antibiotics.    The history is provided by the patient.     Review of patient's allergies indicates:  No Known Allergies  Past Medical History:   Diagnosis Date    GERD (gastroesophageal reflux disease)      Past Surgical History:   Procedure Laterality Date    FOOT FRACTURE SURGERY      grastric sleeve      iye       UMBILICAL HERNIA REPAIR  2007    incarcerated, at [facility]     Family History   Problem Relation Age of Onset    Diabetes Father     Heart disease Father         minor MI    Cancer Maternal Grandfather         throat    Urticaria Brother      Social History     Tobacco Use    Smoking status: Current Some Day Smoker     Packs/day: 0.30     Years: 22.00     Pack years: 6.60     Types: Cigarettes    Smokeless tobacco: Never Used   Substance Use Topics    Alcohol use: Yes     Alcohol/week: 15.0 standard drinks     Types: 15 Cans of beer per week     Comment: Daily ETOH, lastnight    Drug use: No     Review of Systems   Constitutional: Negative for chills and fever.   HENT: Negative for congestion.    Respiratory: Negative for cough and shortness of breath.    Cardiovascular: Negative for chest pain.   Gastrointestinal: Negative for abdominal pain, nausea and vomiting.   Genitourinary: Positive for scrotal swelling and testicular pain.  Negative for dysuria, hematuria, penile discharge and penile pain.   Musculoskeletal: Negative for back pain.   Skin: Negative for rash.   Neurological: Negative for headaches.   Psychiatric/Behavioral: Negative for confusion.       Physical Exam     Initial Vitals   BP Pulse Resp Temp SpO2   12/30/21 1307 12/30/21 1306 12/30/21 1306 12/30/21 1306 12/30/21 1306   (!) 155/96 92 16 99.1 °F (37.3 °C) 99 %      MAP       --                Physical Exam    Constitutional: He appears well-developed and well-nourished. He is not diaphoretic. No distress.   HENT:   Head: Normocephalic and atraumatic.   Neck: Neck supple.   Normal range of motion.  Pulmonary/Chest: No respiratory distress.   Abdominal: Abdomen is soft. There is no abdominal tenderness. Hernia confirmed negative in the right inguinal area and confirmed negative in the left inguinal area. There is no rebound and no guarding.   Genitourinary:    Penis normal.   Right testis shows no mass and no tenderness. Left testis shows swelling and tenderness.    Genitourinary Comments: Chaperone present     Musculoskeletal:         General: Normal range of motion.      Cervical back: Normal range of motion and neck supple.     Neurological: He is alert and oriented to person, place, and time.   Skin: Skin is warm and dry. No rash noted. No erythema.   Psychiatric: He has a normal mood and affect.         ED Course   Procedures  Labs Reviewed   URINALYSIS, REFLEX TO URINE CULTURE - Abnormal; Notable for the following components:       Result Value    Ketones, UA Trace (*)     All other components within normal limits    Narrative:     Specimen Source->Urine          Imaging Results           US Scrotum And Testicles (Final result)  Result time 12/30/21 16:36:38    Final result by Ken El MD (12/30/21 16:36:38)                 Impression:      1. Bilateral edematous epididymal thickening and hyperemia, left greater than right.  Increased vascularity of the  bilateral testicles, left greater than right.  Findings are concerning for epididymo-orchitis noting that postoperative inflammation is likely a contributing factor in the setting of recent vasectomy.  Recommend clinical correlation.  2. Small left complex hydrocele with differential includes spermatocele, pyocele, and hematocele.  This report was flagged in Epic as abnormal.    Electronically signed by resident: Kayley Sanford  Date:    12/30/2021  Time:    16:15    Electronically signed by: Ken El  Date:    12/30/2021  Time:    16:36             Narrative:    EXAMINATION:  US SCROTUM AND TESTICLES    CLINICAL HISTORY:  Left testicular pain    TECHNIQUE:  Sonography of the scrotum and testes.    COMPARISON:  None.    FINDINGS:  Right Testicle:    *Size: 5.2 x 2.4 x 3.1 cm  *Appearance: Normal.  *Flow: Arterial and venous flow present.  Increased vascularity.  *Epididymis: Edematous epididymal thickening and hyperemia.  *Hydrocele: None.  *Varicocele: Present.  Left Testicle:    *Size: 4.7 x 2.4 x 3.2 cm  *Appearance: Normal.  *Flow: Arterial and venous flow present.  Increased vascularity.  *Epididymis: Edematous epididymal thickening and hyperemia.  *Hydrocele: Small complex hydrocele with internal echoes.  *Varicocele: Present.                                 Medications   acetaminophen tablet 1,000 mg (1,000 mg Oral Given 12/30/21 1418)     Medical Decision Making:   History:   Old Medical Records: I decided to obtain old medical records.  Old Records Summarized: records from previous admission(s) and records from clinic visits.       <> Summary of Records: 12/21: vasectomy with Dr Roque  12/24: ED visit, c/o fever. COVID negative  Clinical Tests:   Lab Tests: Ordered and Reviewed  Radiological Study: Ordered and Reviewed       APC / Resident Notes:   46-year-old male with history of GERD presents to the ED complaining of left testicle pain and swelling.  Vital signs stable.  Well-appearing.  There is  some swelling noted to the left testicle with minimal tenderness.  No erythema. Will get UA, ultrasound.    UA with no infection.    4:20PM  Signed out to DIANE Yu pending ultrasound.        Attending Attestation:     Physician Attestation Statement for NP/PA:   I discussed this assessment and plan of this patient with the NP/PA, but I did not personally examine the patient. The face to face encounter was performed by the NP/PA.    Other NP/PA Attestation Additions:      Medical Decision Making: Patient with vasectomy on 12/21 who presents with left sided testicular swelling x 1 day. Awaiting US and formal urology consult at time of sign out of care. Will need to be dispositioned based on urology's evaluation and recommendation given the recent operative procedure.   MIKEY Wilder MD                      Clinical Impression:   Final diagnoses:  [N50.812] Pain in left testicle  [N50.89] Swelling of left testicle                 Selina Larson PA-C  12/30/21 1645       Judy Wilder MD  01/14/22 2027

## 2021-12-31 NOTE — SUBJECTIVE & OBJECTIVE
Past Medical History:   Diagnosis Date    GERD (gastroesophageal reflux disease)        Past Surgical History:   Procedure Laterality Date    FOOT FRACTURE SURGERY      grastric sleeve      iye       UMBILICAL HERNIA REPAIR  2007    incarcerated, at [facility]       Review of patient's allergies indicates:  No Known Allergies    Family History     Problem Relation (Age of Onset)    Cancer Maternal Grandfather    Diabetes Father    Heart disease Father    Urticaria Brother          Tobacco Use    Smoking status: Current Some Day Smoker     Packs/day: 0.30     Years: 22.00     Pack years: 6.60     Types: Cigarettes    Smokeless tobacco: Never Used   Substance and Sexual Activity    Alcohol use: Yes     Alcohol/week: 15.0 standard drinks     Types: 15 Cans of beer per week     Comment: Daily ETOH, lastnight    Drug use: No    Sexual activity: Not on file       Review of Systems   Constitutional: Negative for appetite change, chills, fatigue, fever and unexpected weight change.   HENT: Negative.    Eyes: Negative.    Respiratory: Negative for cough, chest tightness and shortness of breath.    Cardiovascular: Negative for chest pain, palpitations and leg swelling.   Gastrointestinal: Negative for abdominal pain, constipation, diarrhea, nausea and vomiting.   Genitourinary: Positive for scrotal swelling. Negative for flank pain, frequency, hematuria, testicular pain and urgency.   Musculoskeletal: Negative for back pain.   Skin: Negative for rash.   Neurological: Negative for dizziness, syncope, numbness and headaches.   Hematological: Does not bruise/bleed easily.   Psychiatric/Behavioral: Negative.        Objective:     Temp:  [99.1 °F (37.3 °C)] 99.1 °F (37.3 °C)  Pulse:  [86-92] 86  Resp:  [16-18] 18  SpO2:  [99 %] 99 %  BP: (143-155)/(94-96) 143/94     There is no height or weight on file to calculate BMI.           Drains     None                 Physical Exam  Constitutional:       General: He is not in  acute distress.     Appearance: He is well-developed and well-nourished.   Cardiovascular:      Rate and Rhythm: Normal rate.      Pulses: Intact distal pulses.   Pulmonary:      Effort: Pulmonary effort is normal. No respiratory distress.   Abdominal:      General: There is no distension.      Palpations: Abdomen is soft.      Tenderness: There is no abdominal tenderness.   Musculoskeletal:         General: No tenderness. Normal range of motion.   Skin:     General: Skin is warm and dry.      Findings: No rash.   Neurological:      Mental Status: He is alert and oriented to person, place, and time.   Psychiatric:         Mood and Affect: Mood and affect normal.         Judgment: Judgment normal.         Significant Labs:    BMP:  Recent Labs   Lab 12/24/21 2122      K 3.7      CO2 26   BUN 7   CREATININE 1.0   CALCIUM 9.2       CBC:  Recent Labs   Lab 12/24/21 2122   WBC 7.36   HGB 14.0   HCT 41.0          Urine Studies:   Recent Labs   Lab 12/24/21  2154 12/30/21  1408   COLORU Yellow Straw   APPEARANCEUA Clear Clear   PHUR 6.0 6.0   SPECGRAV <=1.005* 1.005   PROTEINUA Negative Negative   GLUCUA Negative Negative   KETONESU Negative Trace*   BILIRUBINUA Negative Negative   OCCULTUA Negative Negative   NITRITE Negative Negative   UROBILINOGEN Negative  --    LEUKOCYTESUR Negative Negative       Significant Imaging:  U/S: I have reviewed all results within the past 24 hours and my personal findings are:  left testicle with reactive hydrocele. Increased blood flow consistent with recent vasectomy

## 2021-12-31 NOTE — DISCHARGE INSTRUCTIONS
Diagnosis:  Testicular swelling    You were seen by our Urology doctors, swelling is expected and they do not believe that you need antibiotics at this time.  You can take ibuprofen as needed for pain up to 2400 mg daily which is 12 of the 200 mg normal strength tablets.  Make sure to wear tight-fitting underwear with scrotal support.    Please schedule follow-up appoint with your urologist.  If you start to have any worsening symptoms, please return to the emergency department.

## 2021-12-31 NOTE — ASSESSMENT & PLAN NOTE
- Swelling not from infectious etiology  - Labs and vitals stable  - Explained that swelling is benign and can be seen after a vasectomy. May be hematoma or reactive hydrocele  - Encouraged NSAIDs, rest, scrotal elevation, and ice for swelling  - OK for discharge from urology perspective    F/u as scheduled with dr ahumada

## 2021-12-31 NOTE — HPI
47 yo male who recently underwent vasectomy with Dr. Roque on 12/21/21 presents to ED with left testicular swelling. Urine negative for infection. He denies pain, frequency, urgency, dysuria. Noted to have bilateral testicular swelling shortly after the vasectomy; the right side has resolved but the left side has not. US scrotum shows increased flow consistent with post vasectomy procedure. No fevers, no leukocytosis.

## 2021-12-31 NOTE — CONSULTS
[facility] - Emergency Dept  Urology  Consult Note    Patient Name: Elyssa Richards  MRN: [id number]  Admission Date: 12/30/2021  Hospital Length of Stay: 0   Code Status: No Order   Attending Provider: No att. providers found   Consulting Provider: Honorio Smyth MD  Primary Care Physician: Ant aMier MD  Principal Problem:<principal problem not specified>    Inpatient consult to Urology  Consult performed by: Honorio Smyth MD  Consult ordered by: Parisa Van PA-C  Reason for consult: left testicular swelling          Subjective:     HPI:  47 yo male who recently underwent vasectomy with Dr. Roque on 12/21/21 presents to ED with left testicular swelling. Urine negative for infection. He denies pain, frequency, urgency, dysuria. Noted to have bilateral testicular swelling shortly after the vasectomy; the right side has resolved but the left side has not. US scrotum shows increased flow consistent with post vasectomy procedure. No fevers, no leukocytosis.      Past Medical History:   Diagnosis Date    GERD (gastroesophageal reflux disease)        Past Surgical History:   Procedure Laterality Date    FOOT FRACTURE SURGERY      grastric sleeve      iye       UMBILICAL HERNIA REPAIR  2007    incarcerated, at [facility]       Review of patient's allergies indicates:  No Known Allergies    Family History     Problem Relation (Age of Onset)    Cancer Maternal Grandfather    Diabetes Father    Heart disease Father    Urticaria Brother          Tobacco Use    Smoking status: Current Some Day Smoker     Packs/day: 0.30     Years: 22.00     Pack years: 6.60     Types: Cigarettes    Smokeless tobacco: Never Used   Substance and Sexual Activity    Alcohol use: Yes     Alcohol/week: 15.0 standard drinks     Types: 15 Cans of beer per week     Comment: Daily ETOH, lastnight    Drug use: No    Sexual activity: Not on file       Review of Systems   Constitutional: Negative for appetite change, chills, fatigue,  fever and unexpected weight change.   HENT: Negative.    Eyes: Negative.    Respiratory: Negative for cough, chest tightness and shortness of breath.    Cardiovascular: Negative for chest pain, palpitations and leg swelling.   Gastrointestinal: Negative for abdominal pain, constipation, diarrhea, nausea and vomiting.   Genitourinary: Positive for scrotal swelling. Negative for flank pain, frequency, hematuria, testicular pain and urgency.   Musculoskeletal: Negative for back pain.   Skin: Negative for rash.   Neurological: Negative for dizziness, syncope, numbness and headaches.   Hematological: Does not bruise/bleed easily.   Psychiatric/Behavioral: Negative.        Objective:     Temp:  [99.1 °F (37.3 °C)] 99.1 °F (37.3 °C)  Pulse:  [86-92] 86  Resp:  [16-18] 18  SpO2:  [99 %] 99 %  BP: (143-155)/(94-96) 143/94     There is no height or weight on file to calculate BMI.           Drains     None                 Physical Exam  Constitutional:       General: He is not in acute distress.     Appearance: He is well-developed and well-nourished.   Cardiovascular:      Rate and Rhythm: Normal rate.      Pulses: Intact distal pulses.   Pulmonary:      Effort: Pulmonary effort is normal. No respiratory distress.   Abdominal:      General: There is no distension.      Palpations: Abdomen is soft.      Tenderness: There is no abdominal tenderness.   Musculoskeletal:         General: No tenderness. Normal range of motion.   Skin:     General: Skin is warm and dry.      Findings: No rash.   Neurological:      Mental Status: He is alert and oriented to person, place, and time.   Psychiatric:         Mood and Affect: Mood and affect normal.         Judgment: Judgment normal.         Significant Labs:    BMP:  Recent Labs   Lab 12/24/21 2122      K 3.7      CO2 26   BUN 7   CREATININE 1.0   CALCIUM 9.2       CBC:  Recent Labs   Lab 12/24/21 2122   WBC 7.36   HGB 14.0   HCT 41.0          Urine Studies:    Recent Labs   Lab 12/24/21  2154 12/30/21  1408   COLORU Yellow Straw   APPEARANCEUA Clear Clear   PHUR 6.0 6.0   SPECGRAV <=1.005* 1.005   PROTEINUA Negative Negative   GLUCUA Negative Negative   KETONESU Negative Trace*   BILIRUBINUA Negative Negative   OCCULTUA Negative Negative   NITRITE Negative Negative   UROBILINOGEN Negative  --    LEUKOCYTESUR Negative Negative       Significant Imaging:  U/S: I have reviewed all results within the past 24 hours and my personal findings are:  left testicle with reactive hydrocele. Increased blood flow consistent with recent vasectomy                    Assessment and Plan:     Testicular swelling, left  - Swelling not from infectious etiology  - Labs and vitals stable  - Explained that swelling is benign and can be seen after a vasectomy. May be hematoma or reactive hydrocele  - Encouraged NSAIDs, rest, scrotal elevation, and ice for swelling  - OK for discharge from urology perspective    F/u as scheduled with dr ahumada        VTE Risk Mitigation (From admission, onward)    None          Thank you for your consult. I will sign off. Please contact us if you have any additional questions.    Honorio Smyth MD  Urology  [facility] - Emergency Dept

## 2022-01-10 ENCOUNTER — PATIENT MESSAGE (OUTPATIENT)
Dept: UROLOGY | Facility: CLINIC | Age: 47
End: 2022-01-10
Payer: COMMERCIAL

## 2022-01-10 ENCOUNTER — PATIENT MESSAGE (OUTPATIENT)
Dept: ADMINISTRATIVE | Facility: OTHER | Age: 47
End: 2022-01-10
Payer: COMMERCIAL

## 2022-01-10 ENCOUNTER — LAB VISIT (OUTPATIENT)
Dept: PRIMARY CARE CLINIC | Facility: CLINIC | Age: 47
End: 2022-01-10
Payer: COMMERCIAL

## 2022-01-10 DIAGNOSIS — Z20.822 CONTACT WITH AND (SUSPECTED) EXPOSURE TO COVID-19: ICD-10-CM

## 2022-01-10 LAB
CTP QC/QA: YES
SARS-COV-2 AG RESP QL IA.RAPID: NEGATIVE

## 2022-01-10 PROCEDURE — 87811 SARS-COV-2 COVID19 W/OPTIC: CPT

## 2022-01-12 ENCOUNTER — PATIENT MESSAGE (OUTPATIENT)
Dept: UROLOGY | Facility: CLINIC | Age: 47
End: 2022-01-12
Payer: COMMERCIAL

## 2022-01-12 ENCOUNTER — TELEPHONE (OUTPATIENT)
Dept: UROLOGY | Facility: CLINIC | Age: 47
End: 2022-01-12
Payer: COMMERCIAL

## 2022-01-12 ENCOUNTER — HOSPITAL ENCOUNTER (OUTPATIENT)
Dept: RADIOLOGY | Facility: HOSPITAL | Age: 47
Discharge: HOME | End: 2022-01-12
Attending: UROLOGY
Payer: COMMERCIAL

## 2022-01-12 DIAGNOSIS — S30.22XA SCROTAL HEMATOMA: Primary | ICD-10-CM

## 2022-01-12 DIAGNOSIS — S30.22XA SCROTAL HEMATOMA: ICD-10-CM

## 2022-01-12 PROCEDURE — 76870 US EXAM SCROTUM: CPT | Mod: 26,,, | Performed by: INTERNAL MEDICINE

## 2022-01-12 PROCEDURE — 76870 US SCROTUM AND TESTICLES: ICD-10-PCS | Mod: 26,,, | Performed by: INTERNAL MEDICINE

## 2022-01-12 PROCEDURE — 76870 US EXAM SCROTUM: CPT | Mod: TC

## 2022-01-14 ENCOUNTER — OFFICE VISIT (OUTPATIENT)
Dept: UROLOGY | Facility: CLINIC | Age: 47
End: 2022-01-14
Payer: COMMERCIAL

## 2022-01-14 ENCOUNTER — PATIENT MESSAGE (OUTPATIENT)
Dept: UROLOGY | Facility: CLINIC | Age: 47
End: 2022-01-14
Payer: COMMERCIAL

## 2022-01-14 VITALS — BODY MASS INDEX: 31.78 KG/M2 | WEIGHT: 227 LBS | HEIGHT: 71 IN

## 2022-01-14 DIAGNOSIS — N50.89 TESTICULAR SWELLING, LEFT: Primary | ICD-10-CM

## 2022-01-14 PROCEDURE — 99999 PR PBB SHADOW E&M-EST. PATIENT-LVL II: CPT | Mod: PBBFAC,,,

## 2022-01-14 PROCEDURE — 99999 PR PBB SHADOW E&M-EST. PATIENT-LVL II: ICD-10-PCS | Mod: PBBFAC,,,

## 2022-01-14 RX ORDER — SULFAMETHOXAZOLE AND TRIMETHOPRIM 800; 160 MG/1; MG/1
1 TABLET ORAL 2 TIMES DAILY
Qty: 28 TABLET | Refills: 0 | Status: SHIPPED | OUTPATIENT
Start: 2022-01-14 | End: 2022-01-28

## 2022-01-14 NOTE — PROGRESS NOTES
"Urology - [facility]  Clinic Note  Brandan Carvalho MD    SUBJECTIVE:     Chief Complaint: scrotal hematoma    History of Present Illness:  Elyssa Richards is a 46 y.o. male who presents to clinic for scrotal hematoma. He is established to our clinic. Referral from No ref. provider found     Patient is a previously healthy 46 year old male s/p vasectomy with Dr. Roque on 12/21/21. Presented to the ED with fever of unknown origin on 12/24, again presented on 12/30 with worsening scrotal swelling. Patient had no fevers or leukocytosis at that time and was discharged home with plans for normal follow up and supportive care. States that he had a fall earlier this week followed by worsening scrotal swelling. Ultrasound reviewed consistent with possible scrotal midline hematoma. States that the right anterior aspect of his scrotum began to spontaneously drain earlier this morning.     He states that he has had subjective fever to 99.9, no chills, nausea, vomiting. No chest pain, SOB. Not presently taking any antibiotics. No anticoagulant, antiplatelet use.     OBJECTIVE:     Estimated body mass index is 31.66 kg/m² as calculated from the following:    Height as of this encounter: 5' 11" (1.803 m).    Weight as of this encounter: 103 kg (227 lb).    Vital Signs (Most Recent)       Physical Exam  Constitutional:       General: He is not in acute distress.     Appearance: He is well-developed. He is not diaphoretic.   HENT:      Head: Normocephalic and atraumatic.   Eyes:      General: No scleral icterus.     Extraocular Movements: EOM normal.   Pulmonary:      Effort: Pulmonary effort is normal. No respiratory distress.      Breath sounds: No stridor.   Abdominal:      General: There is no distension.      Palpations: Abdomen is soft.      Tenderness: There is no abdominal tenderness.   Genitourinary:     Comments: Circumcised male with orthotopic meatus. Scrotal swelling present. Pinpoint area of right hemiscrotum " draining turbid, thin fluid. There is some induration of the left hemiscrotum without any discrete fluctuance.  Musculoskeletal:         General: Normal range of motion.      Cervical back: Normal range of motion.   Skin:     General: Skin is warm and dry.   Neurological:      Mental Status: He is alert.   Psychiatric:         Mood and Affect: Mood and affect normal.         Behavior: Behavior normal.         Lab Results   Component Value Date    BUN 7 12/24/2021    CREATININE 1.0 12/24/2021    WBC 7.36 12/24/2021    HGB 14.0 12/24/2021    HCT 41.0 12/24/2021     12/24/2021    AST 18 12/24/2021    ALT 11 12/24/2021    ALKPHOS 94 12/24/2021    ALBUMIN 3.9 12/24/2021    HGBA1C 4.1 08/25/2010        Imaging:  As above    ASSESSMENT     1. Testicular swelling, left      PLAN:   Elyssa was seen today for testicle pain.    Diagnoses and all orders for this visit:    Testicular swelling, left    - Scrotum spontaneously draining with improvement in pain and swelling. Discussed continued local wound care and scrotal support.  - Fluid sent for culture, will treat with bactrim empirically. Will tailor antibiotics as cultures permit  - Plan to follow up with Dr. Roque after completion of antibiotics  - Given strict ED return precautions    Jv Apodaca MD     Letter to No ref. provider found

## 2022-01-15 NOTE — PROVIDER PROGRESS NOTES - EMERGENCY DEPT.
Encounter Date: 12/30/2021    ED Physician Progress Notes           ED Physician Hand-off Note:    ED Course: I assumed care of patient from off-going ED physician team. Briefly, Patient is a 38-year-old male presenting for testicular pain after recent vasectomy.    At the time of signout plan was pending testicular ultrasound.    Medications given in the ED:    Medications   acetaminophen tablet 1,000 mg (1,000 mg Oral Given 12/30/21 1418)     Testicular ultrasound concerning for epididymitis.  I discussed this finding with Urology who evaluated the patient and feel his clinical presentation is consistent with normal postop swelling and not epididymitis.  Will discharge with NSAIDs per urology recommendations with plans for outpatient follow-up.  Patient comfortable with discharge. Stressed the importance of follow-up, strict ED return precautions given.  Patient voiced understanding and is comfortable with discharge.

## 2022-02-02 ENCOUNTER — OFFICE VISIT (OUTPATIENT)
Dept: UROLOGY | Facility: CLINIC | Age: 47
End: 2022-02-02
Payer: COMMERCIAL

## 2022-02-02 VITALS
BODY MASS INDEX: 31.14 KG/M2 | WEIGHT: 222.44 LBS | HEART RATE: 89 BPM | HEIGHT: 71 IN | DIASTOLIC BLOOD PRESSURE: 90 MMHG | SYSTOLIC BLOOD PRESSURE: 140 MMHG

## 2022-02-02 DIAGNOSIS — Z98.890 POST-OPERATIVE STATE: Primary | ICD-10-CM

## 2022-02-02 PROCEDURE — 3077F SYST BP >= 140 MM HG: CPT | Mod: CPTII,S$GLB,, | Performed by: UROLOGY

## 2022-02-02 PROCEDURE — 99999 PR PBB SHADOW E&M-EST. PATIENT-LVL III: CPT | Mod: PBBFAC,,, | Performed by: UROLOGY

## 2022-02-02 PROCEDURE — 1159F PR MEDICATION LIST DOCUMENTED IN MEDICAL RECORD: ICD-10-PCS | Mod: CPTII,S$GLB,, | Performed by: UROLOGY

## 2022-02-02 PROCEDURE — 99024 PR POST-OP FOLLOW-UP VISIT: ICD-10-PCS | Mod: S$GLB,,, | Performed by: UROLOGY

## 2022-02-02 PROCEDURE — 1160F PR REVIEW ALL MEDS BY PRESCRIBER/CLIN PHARMACIST DOCUMENTED: ICD-10-PCS | Mod: CPTII,S$GLB,, | Performed by: UROLOGY

## 2022-02-02 PROCEDURE — 3080F PR MOST RECENT DIASTOLIC BLOOD PRESSURE >= 90 MM HG: ICD-10-PCS | Mod: CPTII,S$GLB,, | Performed by: UROLOGY

## 2022-02-02 PROCEDURE — 3077F PR MOST RECENT SYSTOLIC BLOOD PRESSURE >= 140 MM HG: ICD-10-PCS | Mod: CPTII,S$GLB,, | Performed by: UROLOGY

## 2022-02-02 PROCEDURE — 99999 PR PBB SHADOW E&M-EST. PATIENT-LVL III: ICD-10-PCS | Mod: PBBFAC,,, | Performed by: UROLOGY

## 2022-02-02 PROCEDURE — 99024 POSTOP FOLLOW-UP VISIT: CPT | Mod: S$GLB,,, | Performed by: UROLOGY

## 2022-02-02 PROCEDURE — 3080F DIAST BP >= 90 MM HG: CPT | Mod: CPTII,S$GLB,, | Performed by: UROLOGY

## 2022-02-02 PROCEDURE — 1160F RVW MEDS BY RX/DR IN RCRD: CPT | Mod: CPTII,S$GLB,, | Performed by: UROLOGY

## 2022-02-02 PROCEDURE — 3008F PR BODY MASS INDEX (BMI) DOCUMENTED: ICD-10-PCS | Mod: CPTII,S$GLB,, | Performed by: UROLOGY

## 2022-02-02 PROCEDURE — 1159F MED LIST DOCD IN RCRD: CPT | Mod: CPTII,S$GLB,, | Performed by: UROLOGY

## 2022-02-02 PROCEDURE — 3008F BODY MASS INDEX DOCD: CPT | Mod: CPTII,S$GLB,, | Performed by: UROLOGY

## 2022-02-02 NOTE — PROGRESS NOTES
Subjective:       Patient ID: Elyssa Richards is a 46 y.o. male.    Chief Complaint: Follow-up    HPI patient is status post bilateral vasectomy with a postop left-sided hematoma.  There was some trauma associated with this.  He has some spontaneous drainage and the swelling is going down and he is feeling better.  He has not had a post vas specimen done yet.  He is feeling better    Past Medical History:   Diagnosis Date    GERD (gastroesophageal reflux disease)        Past Surgical History:   Procedure Laterality Date    FOOT FRACTURE SURGERY      grastric sleeve      iye       UMBILICAL HERNIA REPAIR  2007    incarcerated, at [facility]       Family History   Problem Relation Age of Onset    Diabetes Father     Heart disease Father         minor MI    Cancer Maternal Grandfather         throat    Urticaria Brother        Social History     Socioeconomic History    Marital status:    Tobacco Use    Smoking status: Current Some Day Smoker     Packs/day: 0.30     Years: 22.00     Pack years: 6.60     Types: Cigarettes    Smokeless tobacco: Never Used   Substance and Sexual Activity    Alcohol use: Yes     Alcohol/week: 15.0 standard drinks     Types: 15 Cans of beer per week     Comment: Daily ETOH, lastnight    Drug use: No       Allergies:  Patient has no known allergies.    Medications:    Current Outpatient Medications:     ibuprofen (ADVIL,MOTRIN) 600 MG tablet, Take 1 tablet (600 mg total) by mouth every 6 (six) hours as needed., Disp: 20 tablet, Rfl: 0    omeprazole (PRILOSEC) 20 MG capsule, , Disp: , Rfl:     Review of Systems   Constitutional: Negative for activity change, appetite change, chills, diaphoresis, fatigue, fever and unexpected weight change.   HENT: Negative for congestion, dental problem, hearing loss, mouth sores, postnasal drip, rhinorrhea, sinus pressure and trouble swallowing.    Eyes: Negative for pain, discharge and itching.   Respiratory: Negative for apnea, cough,  choking, chest tightness, shortness of breath and wheezing.    Cardiovascular: Negative for chest pain, palpitations and leg swelling.   Gastrointestinal: Negative for abdominal distention, abdominal pain, anal bleeding, blood in stool, constipation, diarrhea, nausea, rectal pain and vomiting.   Endocrine: Negative for polydipsia and polyuria.   Genitourinary: Negative for decreased urine volume, difficulty urinating, dysuria, enuresis, flank pain, frequency, genital sores, hematuria, penile discharge, penile pain, penile swelling, scrotal swelling, testicular pain and urgency.   Musculoskeletal: Negative for arthralgias, back pain and myalgias.   Skin: Negative for color change, rash and wound.   Neurological: Negative for dizziness, syncope, speech difficulty, light-headedness and headaches.   Hematological: Negative for adenopathy. Does not bruise/bleed easily.   Psychiatric/Behavioral: Negative for behavioral problems, confusion, hallucinations and sleep disturbance.       Objective:      Physical Exam  Constitutional:       Appearance: He is well-developed.   HENT:      Head: Normocephalic.   Cardiovascular:      Rate and Rhythm: Normal rate.   Pulmonary:      Effort: Pulmonary effort is normal.   Abdominal:      Palpations: Abdomen is soft.   Genitourinary:     Comments: Left-sided hematoma with ecchymosis which is healing on and swelling which is going down.  No discharge or evidence of infection  Skin:     General: Skin is warm.   Neurological:      Mental Status: He is alert.   Psychiatric:         Mood and Affect: Mood and affect normal.         Assessment:       1. Post-operative state        Plan:       Elyssa was seen today for follow-up.    Diagnoses and all orders for this visit:    Post-operative state    Continue conservative management patient will bring his post vas specimen and when he can

## 2022-03-09 ENCOUNTER — OFFICE VISIT (OUTPATIENT)
Dept: PODIATRY | Facility: CLINIC | Age: 47
End: 2022-03-09
Payer: COMMERCIAL

## 2022-03-09 VITALS
HEIGHT: 71 IN | DIASTOLIC BLOOD PRESSURE: 81 MMHG | BODY MASS INDEX: 31.08 KG/M2 | HEART RATE: 95 BPM | SYSTOLIC BLOOD PRESSURE: 137 MMHG | WEIGHT: 222 LBS

## 2022-03-09 DIAGNOSIS — M79.672 FOOT PAIN, BILATERAL: ICD-10-CM

## 2022-03-09 DIAGNOSIS — M24.573 EQUINUS CONTRACTURE OF ANKLE: ICD-10-CM

## 2022-03-09 DIAGNOSIS — M79.671 FOOT PAIN, BILATERAL: ICD-10-CM

## 2022-03-09 DIAGNOSIS — M77.9 CAPSULITIS: Primary | ICD-10-CM

## 2022-03-09 PROCEDURE — 3008F PR BODY MASS INDEX (BMI) DOCUMENTED: ICD-10-PCS | Mod: CPTII,S$GLB,, | Performed by: PODIATRIST

## 2022-03-09 PROCEDURE — 99999 PR PBB SHADOW E&M-EST. PATIENT-LVL III: CPT | Mod: PBBFAC,,, | Performed by: PODIATRIST

## 2022-03-09 PROCEDURE — 3008F BODY MASS INDEX DOCD: CPT | Mod: CPTII,S$GLB,, | Performed by: PODIATRIST

## 2022-03-09 PROCEDURE — 1160F RVW MEDS BY RX/DR IN RCRD: CPT | Mod: CPTII,S$GLB,, | Performed by: PODIATRIST

## 2022-03-09 PROCEDURE — 1160F PR REVIEW ALL MEDS BY PRESCRIBER/CLIN PHARMACIST DOCUMENTED: ICD-10-PCS | Mod: CPTII,S$GLB,, | Performed by: PODIATRIST

## 2022-03-09 PROCEDURE — 99204 PR OFFICE/OUTPT VISIT, NEW, LEVL IV, 45-59 MIN: ICD-10-PCS | Mod: 25,S$GLB,, | Performed by: PODIATRIST

## 2022-03-09 PROCEDURE — 29540 PR STRAPPING; ANKLE &/OR FOOT: ICD-10-PCS | Mod: 50,S$GLB,, | Performed by: PODIATRIST

## 2022-03-09 PROCEDURE — 99999 PR PBB SHADOW E&M-EST. PATIENT-LVL III: ICD-10-PCS | Mod: PBBFAC,,, | Performed by: PODIATRIST

## 2022-03-09 PROCEDURE — 1159F MED LIST DOCD IN RCRD: CPT | Mod: CPTII,S$GLB,, | Performed by: PODIATRIST

## 2022-03-09 PROCEDURE — 3075F SYST BP GE 130 - 139MM HG: CPT | Mod: CPTII,S$GLB,, | Performed by: PODIATRIST

## 2022-03-09 PROCEDURE — 1159F PR MEDICATION LIST DOCUMENTED IN MEDICAL RECORD: ICD-10-PCS | Mod: CPTII,S$GLB,, | Performed by: PODIATRIST

## 2022-03-09 PROCEDURE — 3075F PR MOST RECENT SYSTOLIC BLOOD PRESS GE 130-139MM HG: ICD-10-PCS | Mod: CPTII,S$GLB,, | Performed by: PODIATRIST

## 2022-03-09 PROCEDURE — 3079F DIAST BP 80-89 MM HG: CPT | Mod: CPTII,S$GLB,, | Performed by: PODIATRIST

## 2022-03-09 PROCEDURE — 99204 OFFICE O/P NEW MOD 45 MIN: CPT | Mod: 25,S$GLB,, | Performed by: PODIATRIST

## 2022-03-09 PROCEDURE — 3079F PR MOST RECENT DIASTOLIC BLOOD PRESSURE 80-89 MM HG: ICD-10-PCS | Mod: CPTII,S$GLB,, | Performed by: PODIATRIST

## 2022-03-09 PROCEDURE — 29540 STRAPPING ANKLE &/FOOT: CPT | Mod: 50,S$GLB,, | Performed by: PODIATRIST

## 2022-03-09 RX ORDER — MELOXICAM 15 MG/1
15 TABLET ORAL DAILY
Qty: 30 TABLET | Refills: 0 | Status: SHIPPED | OUTPATIENT
Start: 2022-03-09 | End: 2022-05-19

## 2022-03-09 NOTE — PROGRESS NOTES
Subjective:      Patient ID: Elyssa Richards is a 46 y.o. male.    Chief Complaint: Foot Pain (Bilateral foot)    Sharp deep throbbing pain at the bottom of the forefoot right and left feet.  Gradual onset, worsening over the past month or so.  Aggravated by increased weight-bearing prolonged standing some shoes.  No prior medical treatment.  No self-treatment.  Patient denies trauma and surgery both feet.  Relates condition diagnose prior not bothering him today due to working on shifts which are unstable    Review of Systems   Constitutional: Negative for chills, diaphoresis, fever, malaise/fatigue and night sweats.   Cardiovascular: Negative for claudication, cyanosis, leg swelling and syncope.   Skin: Negative for color change, dry skin, nail changes, rash, suspicious lesions and unusual hair distribution.   Musculoskeletal: Positive for joint pain. Negative for falls, joint swelling, muscle cramps, muscle weakness and stiffness.   Gastrointestinal: Negative for constipation, diarrhea, nausea and vomiting.   Neurological: Negative for brief paralysis, disturbances in coordination, focal weakness, numbness, paresthesias, sensory change and tremors.           Objective:      Physical Exam  Constitutional:       General: He is not in acute distress.     Appearance: He is well-developed. He is not diaphoretic.   Cardiovascular:      Pulses:           Popliteal pulses are 2+ on the right side and 2+ on the left side.        Dorsalis pedis pulses are 2+ on the right side and 2+ on the left side.        Posterior tibial pulses are 2+ on the right side and 2+ on the left side.      Comments: Capillary refill 3 seconds all toes/distal feet, all toes/both feet warm to touch.      Negative lymphadenopathy bilateral popliteal fossa and tarsal tunnel.      Negavie lower extremity edema bilateral.    Musculoskeletal:      Right ankle: No swelling, deformity, ecchymosis or lacerations. Normal range of motion. Normal pulse.       Right Achilles Tendon: Normal. No defects. Tejeda's test negative.      Comments: Pain to palpation inferior mtpj 1-5 bilateral without evidence of trauma or infection.;    Ankle dorsiflexion decreased at <10 degrees bilateral with moderate increase with knee flexion bilateral.    Otherwise , Normal angle, base, station of gait. All ten toes without clubbing, cyanosis, or signs of ischemia.  No pain to palpation bilateral lower extremities.  Range of motion, stability, muscle strength, and muscle tone normal bilateral feet and legs.    Lymphadenopathy:      Lower Body: No right inguinal adenopathy. No left inguinal adenopathy.      Comments: Negative lymphadenopathy bilateral popliteal fossa and tarsal tunnel.    Negative lymphangitic streaking bilateral feet/ankles/legs.   Skin:     General: Skin is warm and dry.      Capillary Refill: Capillary refill takes 2 to 3 seconds.      Coloration: Skin is not pale.      Findings: No abrasion, bruising, burn, ecchymosis, erythema, laceration, lesion or rash.      Nails: There is no clubbing.      Comments:   Skin is normal age and health appropriate color, turgor, texture, and temperature bilateral lower extremities without ulceration, hyperpigmentation, discoloration, masses nodules or cords palpated.  No ecchymosis, erythema, edema, or cardinal signs of infection bilateral lower extremities.     Neurological:      Mental Status: He is alert and oriented to person, place, and time.      Sensory: No sensory deficit.      Motor: No tremor, atrophy or abnormal muscle tone.      Gait: Gait normal.      Deep Tendon Reflexes:      Reflex Scores:       Patellar reflexes are 2+ on the right side and 2+ on the left side.       Achilles reflexes are 2+ on the right side and 2+ on the left side.     Comments: Negative tinel sign to percussion sural, superficial peroneal, deep peroneal, saphenous, and posterior tibial nerves right and left ankles and feet.      Negative moulder  sign/click bilateral all intermetatarsal spaces.     Psychiatric:         Behavior: Behavior is cooperative.               Assessment:       Encounter Diagnoses   Name Primary?    Capsulitis Yes    Equinus contracture of ankle     Foot pain, bilateral          Plan:       Elyssa was seen today for foot pain.    Diagnoses and all orders for this visit:    Capsulitis    Equinus contracture of ankle    Foot pain, bilateral    Other orders  -     meloxicam (MOBIC) 15 MG tablet; Take 1 tablet (15 mg total) by mouth once daily.      I counseled the patient on his conditions, their implications and medical management.        Patient will stretch the tendo achilles complex three times daily as demonstrated in the office.  Literature was dispensed illustrating proper stretching technique.    I applied a plantar rest strapping to the patient's right and left foot to offload symptomatic area, support the arch, and relieve pain.    Patient will obtain over the counter arch supports and wear them in shoes whenever possible.  Athletic shoes intended for walking or running are usually best.    The patient was advised that NSAID-type medications have two very important potential side effects: gastrointestinal irritation including hemorrhage and renal injuries. He was asked to take the medication with food and to stop if he experiences any GI upset. I asked him to call for vomiting, abdominal pain or black/bloody stools. The patient expresses understanding of these issues and questions were answered.    Discussed conservative treatment with shoes of adequate dimensions, material, and style to alleviate symptoms and delay or prevent surgical intervention.    meloxicam          Follow up in about 1 month (around 4/9/2022).

## 2022-04-13 ENCOUNTER — OFFICE VISIT (OUTPATIENT)
Dept: PODIATRY | Facility: CLINIC | Age: 47
End: 2022-04-13
Payer: COMMERCIAL

## 2022-04-13 VITALS
DIASTOLIC BLOOD PRESSURE: 89 MMHG | HEIGHT: 71 IN | HEART RATE: 97 BPM | BODY MASS INDEX: 31.08 KG/M2 | SYSTOLIC BLOOD PRESSURE: 151 MMHG | WEIGHT: 222 LBS

## 2022-04-13 DIAGNOSIS — M79.671 FOOT PAIN, BILATERAL: ICD-10-CM

## 2022-04-13 DIAGNOSIS — M24.573 EQUINUS CONTRACTURE OF ANKLE: ICD-10-CM

## 2022-04-13 DIAGNOSIS — M79.672 FOOT PAIN, BILATERAL: ICD-10-CM

## 2022-04-13 DIAGNOSIS — M77.9 CAPSULITIS: Primary | ICD-10-CM

## 2022-04-13 PROCEDURE — 99212 OFFICE O/P EST SF 10 MIN: CPT | Mod: S$GLB,,, | Performed by: PODIATRIST

## 2022-04-13 PROCEDURE — 99999 PR PBB SHADOW E&M-EST. PATIENT-LVL III: ICD-10-PCS | Mod: PBBFAC,,, | Performed by: PODIATRIST

## 2022-04-13 PROCEDURE — 1159F MED LIST DOCD IN RCRD: CPT | Mod: CPTII,S$GLB,, | Performed by: PODIATRIST

## 2022-04-13 PROCEDURE — 1160F RVW MEDS BY RX/DR IN RCRD: CPT | Mod: CPTII,S$GLB,, | Performed by: PODIATRIST

## 2022-04-13 PROCEDURE — 3079F PR MOST RECENT DIASTOLIC BLOOD PRESSURE 80-89 MM HG: ICD-10-PCS | Mod: CPTII,S$GLB,, | Performed by: PODIATRIST

## 2022-04-13 PROCEDURE — 1159F PR MEDICATION LIST DOCUMENTED IN MEDICAL RECORD: ICD-10-PCS | Mod: CPTII,S$GLB,, | Performed by: PODIATRIST

## 2022-04-13 PROCEDURE — 99212 PR OFFICE/OUTPT VISIT, EST, LEVL II, 10-19 MIN: ICD-10-PCS | Mod: S$GLB,,, | Performed by: PODIATRIST

## 2022-04-13 PROCEDURE — 3008F BODY MASS INDEX DOCD: CPT | Mod: CPTII,S$GLB,, | Performed by: PODIATRIST

## 2022-04-13 PROCEDURE — 99999 PR PBB SHADOW E&M-EST. PATIENT-LVL III: CPT | Mod: PBBFAC,,, | Performed by: PODIATRIST

## 2022-04-13 PROCEDURE — 3079F DIAST BP 80-89 MM HG: CPT | Mod: CPTII,S$GLB,, | Performed by: PODIATRIST

## 2022-04-13 PROCEDURE — 1160F PR REVIEW ALL MEDS BY PRESCRIBER/CLIN PHARMACIST DOCUMENTED: ICD-10-PCS | Mod: CPTII,S$GLB,, | Performed by: PODIATRIST

## 2022-04-13 PROCEDURE — 3008F PR BODY MASS INDEX (BMI) DOCUMENTED: ICD-10-PCS | Mod: CPTII,S$GLB,, | Performed by: PODIATRIST

## 2022-04-13 PROCEDURE — 3077F PR MOST RECENT SYSTOLIC BLOOD PRESSURE >= 140 MM HG: ICD-10-PCS | Mod: CPTII,S$GLB,, | Performed by: PODIATRIST

## 2022-04-13 PROCEDURE — 3077F SYST BP >= 140 MM HG: CPT | Mod: CPTII,S$GLB,, | Performed by: PODIATRIST

## 2022-04-13 NOTE — PROGRESS NOTES
Subjective:      Patient ID: Elyssa Richards is a 46 y.o. male.    Chief Complaint: Follow-up (bilateral)    Sharp deep throbbing pain at the bottom of the forefoot right and left feet.  Gradual onset, improved well over the past month or so.  Aggravated by increased weight-bearing prolonged standing some shoes.  Good relief from home stretches, otc inserts, otrhof-eet work boots.  Patient denies trauma and surgery both feet.  Relates condition diagnose prior not bothering him today due to working on shifts which are unstable    Review of Systems   Constitutional: Negative for chills, diaphoresis, fever, malaise/fatigue and night sweats.   Cardiovascular: Negative for claudication, cyanosis, leg swelling and syncope.   Skin: Negative for color change, dry skin, nail changes, rash, suspicious lesions and unusual hair distribution.   Musculoskeletal: Positive for joint pain. Negative for falls, joint swelling, muscle cramps, muscle weakness and stiffness.   Gastrointestinal: Negative for constipation, diarrhea, nausea and vomiting.   Neurological: Negative for brief paralysis, disturbances in coordination, focal weakness, numbness, paresthesias, sensory change and tremors.           Objective:      Physical Exam  Constitutional:       General: He is not in acute distress.     Appearance: He is well-developed. He is not diaphoretic.   Cardiovascular:      Pulses:           Popliteal pulses are 2+ on the right side and 2+ on the left side.        Dorsalis pedis pulses are 2+ on the right side and 2+ on the left side.        Posterior tibial pulses are 2+ on the right side and 2+ on the left side.      Comments: Capillary refill 3 seconds all toes/distal feet, all toes/both feet warm to touch.      Negative lymphadenopathy bilateral popliteal fossa and tarsal tunnel.      Negavie lower extremity edema bilateral.    Musculoskeletal:      Right ankle: No swelling, deformity, ecchymosis or lacerations. Normal range of motion.  Normal pulse.      Right Achilles Tendon: Normal. No defects. Tejeda's test negative.      Comments: No current pain to palpation inferior mtpj 1-5 bilateral without evidence of trauma or infection.;    Ankle dorsiflexion decreased at <10 degrees bilateral with moderate increase with knee flexion bilateral.    Otherwise , Normal angle, base, station of gait. All ten toes without clubbing, cyanosis, or signs of ischemia.  No pain to palpation bilateral lower extremities.  Range of motion, stability, muscle strength, and muscle tone normal bilateral feet and legs.    Lymphadenopathy:      Lower Body: No right inguinal adenopathy. No left inguinal adenopathy.      Comments: Negative lymphadenopathy bilateral popliteal fossa and tarsal tunnel.    Negative lymphangitic streaking bilateral feet/ankles/legs.   Skin:     General: Skin is warm and dry.      Capillary Refill: Capillary refill takes 2 to 3 seconds.      Coloration: Skin is not pale.      Findings: No abrasion, bruising, burn, ecchymosis, erythema, laceration, lesion or rash.      Nails: There is no clubbing.      Comments:   Skin is normal age and health appropriate color, turgor, texture, and temperature bilateral lower extremities without ulceration, hyperpigmentation, discoloration, masses nodules or cords palpated.  No ecchymosis, erythema, edema, or cardinal signs of infection bilateral lower extremities.     Neurological:      Mental Status: He is alert and oriented to person, place, and time.      Sensory: No sensory deficit.      Motor: No tremor, atrophy or abnormal muscle tone.      Gait: Gait normal.      Deep Tendon Reflexes:      Reflex Scores:       Patellar reflexes are 2+ on the right side and 2+ on the left side.       Achilles reflexes are 2+ on the right side and 2+ on the left side.     Comments: Negative tinel sign to percussion sural, superficial peroneal, deep peroneal, saphenous, and posterior tibial nerves right and left ankles and  feet.      Negative moulder sign/click bilateral all intermetatarsal spaces.     Psychiatric:         Behavior: Behavior is cooperative.               Assessment:       Encounter Diagnoses   Name Primary?    Capsulitis Yes    Equinus contracture of ankle     Foot pain, bilateral          Plan:       Elyssa was seen today for follow-up.    Diagnoses and all orders for this visit:    Capsulitis    Equinus contracture of ankle    Foot pain, bilateral      I counseled the patient on his conditions, their implications and medical management.        Continue stretches, inserts, atheltic shoes/ortho-feet work boots, activity to tolerance.        Follow up if symptoms worsen or fail to improve.

## 2022-04-28 ENCOUNTER — OFFICE VISIT (OUTPATIENT)
Dept: URGENT CARE | Facility: CLINIC | Age: 47
End: 2022-04-28
Payer: COMMERCIAL

## 2022-04-28 VITALS
SYSTOLIC BLOOD PRESSURE: 141 MMHG | HEIGHT: 71 IN | WEIGHT: 222 LBS | BODY MASS INDEX: 31.08 KG/M2 | RESPIRATION RATE: 18 BRPM | TEMPERATURE: 97 F | DIASTOLIC BLOOD PRESSURE: 90 MMHG | HEART RATE: 85 BPM | OXYGEN SATURATION: 97 %

## 2022-04-28 DIAGNOSIS — K62.5 RECTAL BLEEDING: Primary | ICD-10-CM

## 2022-04-28 DIAGNOSIS — K64.4 EXTERNAL HEMORRHOID, BLEEDING: ICD-10-CM

## 2022-04-28 PROCEDURE — 10060 I&D ABSCESS SIMPLE/SINGLE: CPT | Mod: S$GLB,,,

## 2022-04-28 PROCEDURE — 1160F RVW MEDS BY RX/DR IN RCRD: CPT | Mod: CPTII,S$GLB,,

## 2022-04-28 PROCEDURE — 10060 INCISION & DRAINAGE: ICD-10-PCS | Mod: S$GLB,,,

## 2022-04-28 PROCEDURE — 3008F BODY MASS INDEX DOCD: CPT | Mod: CPTII,S$GLB,,

## 2022-04-28 PROCEDURE — 1159F PR MEDICATION LIST DOCUMENTED IN MEDICAL RECORD: ICD-10-PCS | Mod: CPTII,S$GLB,,

## 2022-04-28 PROCEDURE — 3080F DIAST BP >= 90 MM HG: CPT | Mod: CPTII,S$GLB,,

## 2022-04-28 PROCEDURE — 1159F MED LIST DOCD IN RCRD: CPT | Mod: CPTII,S$GLB,,

## 2022-04-28 PROCEDURE — 3077F PR MOST RECENT SYSTOLIC BLOOD PRESSURE >= 140 MM HG: ICD-10-PCS | Mod: CPTII,S$GLB,,

## 2022-04-28 PROCEDURE — 1160F PR REVIEW ALL MEDS BY PRESCRIBER/CLIN PHARMACIST DOCUMENTED: ICD-10-PCS | Mod: CPTII,S$GLB,,

## 2022-04-28 PROCEDURE — 3080F PR MOST RECENT DIASTOLIC BLOOD PRESSURE >= 90 MM HG: ICD-10-PCS | Mod: CPTII,S$GLB,,

## 2022-04-28 PROCEDURE — 3077F SYST BP >= 140 MM HG: CPT | Mod: CPTII,S$GLB,,

## 2022-04-28 PROCEDURE — 99204 OFFICE O/P NEW MOD 45 MIN: CPT | Mod: 25,S$GLB,,

## 2022-04-28 PROCEDURE — 99204 PR OFFICE/OUTPT VISIT, NEW, LEVL IV, 45-59 MIN: ICD-10-PCS | Mod: 25,S$GLB,,

## 2022-04-28 PROCEDURE — 3008F PR BODY MASS INDEX (BMI) DOCUMENTED: ICD-10-PCS | Mod: CPTII,S$GLB,,

## 2022-04-28 RX ORDER — HYDROCORTISONE ACETATE PRAMOXINE HCL 1; 1 G/100G; G/100G
CREAM TOPICAL 3 TIMES DAILY
Qty: 30 G | Refills: 0 | Status: SHIPPED | OUTPATIENT
Start: 2022-04-28 | End: 2022-05-05

## 2022-04-28 NOTE — PROGRESS NOTES
"Subjective:       Patient ID: Elyssa Richards Jr. is a 46 y.o. male.    Vitals:  height is 5' 11" (1.803 m) and weight is 100.7 kg (222 lb). His temperature is 96.9 °F (36.1 °C). His blood pressure is 141/90 (abnormal) and his pulse is 85. His respiration is 18 and oxygen saturation is 97%.     Chief Complaint: Rectal Bleeding    Large chuncks of black stuff with red all around it after coughing around 8 am this morning. He shoved toilet paper in his rectum. Still bleeding when he got home, but has decreased. Denies dizziness or lightheadedness. Denies hx of hemorrhoids. Denies hx of this. Last bowel movements was last night around 8 pm. Normal stool. Denies straining when he uses the bathroom. He does use tobacco products 4-5 per day. Denies family hx of cancer. Gastric sleeve in 2010. Umbilical hernia in 2006, inguinal hernia in 2012.     Rectal Bleeding  This is a new problem. The current episode started today. The problem occurs constantly. The problem has been unchanged. Pertinent negatives include no abdominal pain, change in bowel habit, chest pain, chills, congestion, coughing, diaphoresis, fatigue, myalgias, nausea, numbness, rash, sore throat, urinary symptoms or vomiting. Nothing aggravates the symptoms. He has tried nothing for the symptoms. The treatment provided no relief.       Constitution: Negative for chills, sweating and fatigue.   HENT: Negative for ear pain, ear discharge, congestion, sinus pain, sinus pressure and sore throat.    Cardiovascular: Negative for chest trauma, chest pain, leg swelling and palpitations.   Eyes: Negative for eye pain, eye redness and photophobia.   Respiratory: Negative for cough and shortness of breath.    Gastrointestinal: Positive for history of abdominal surgery, bright red blood in stool and rectal bleeding. Negative for abdominal trauma, abdominal pain, abdominal bloating, nausea, vomiting, constipation, diarrhea, rectal pain, hemorrhoids and heartburn. "   Musculoskeletal: Negative for pain, trauma and muscle ache.   Skin: Negative for color change, pale and rash.   Neurological: Negative for dizziness, light-headedness, altered mental status, numbness, tingling and seizures.   Psychiatric/Behavioral: Negative for altered mental status and confusion.       Objective:      Physical Exam   Constitutional: He is oriented to person, place, and time. He appears well-developed. No distress.   HENT:   Head: Normocephalic and atraumatic.   Ears:   Right Ear: External ear normal.   Left Ear: External ear normal.   Nose: Nose normal. No nasal deformity. No epistaxis.   Mouth/Throat: Oropharynx is clear and moist and mucous membranes are normal.   Eyes: Conjunctivae and lids are normal.   Neck: Trachea normal and phonation normal. Neck supple.   Cardiovascular: Normal rate, regular rhythm and normal heart sounds.   Pulmonary/Chest: Effort normal and breath sounds normal.   Abdominal: Normal appearance and bowel sounds are normal. He exhibits no distension. Soft. There is no abdominal tenderness.   Genitourinary: Rectum:      External hemorrhoid present.      No rectal mass, tenderness, internal hemorrhoid or abnormal anal tone.                 Comments: External hemorrhoid noted to the 3 o clock position that is actively bleeding.      Musculoskeletal: Normal range of motion.         General: Normal range of motion.   Neurological: He is alert and oriented to person, place, and time. He has normal reflexes.   Skin: Skin is warm, dry, intact and not diaphoretic.   Psychiatric: His speech is normal and behavior is normal. Judgment and thought content normal.   Nursing note and vitals reviewed.chaperone present (EMERITA Hall and Parul Muhammad NP)           Assessment:       1. Rectal bleeding    2. External hemorrhoid, bleeding          Plan:         Rectal bleeding    External hemorrhoid, bleeding  -     pramoxine-hydrocortisone (PROCTOCREAM-HC) 1-1 % rectal cream; Place rectally 3  (three) times daily. for 7 days  Dispense: 30 g; Refill: 0  -     Incision & Drainage                 Patient Instructions   - Rest.    - Drink plenty of fluids.    - Acetaminophen (tylenol) or Ibuprofen (advil,motrin) as directed as needed for fever/pain. Avoid tylenol if you have a history of liver disease. Do not take ibuprofen if you have a history of GI bleeding, kidney disease, or if you take blood thinners.   - Ibuprofen dosing for adults: 400 mg by mouth every 4-6 hours as needed. Max: 2400 mg/day; Info: use lowest effective dose, shortest effective treatment duration; give w/ food if GI upset occurs.  - Tylenol dosing for adults: [By mouth route, immediate-release form] Dose: 325-1000 mg by mouth every 4-6h as needed; Max: 1 g/4h and 4 g/day from all sources. [By mouth route, extended-release form] Dose: 650-1300 mg Extended Release by mouth every 8h as needed; Max: 4 g/day from all sources.     - You must understand that you have received an Urgent Care treatment only and that you may be released before all of your medical problems are known or treated.   - You, the patient, will arrange for follow up care as instructed.   - If your condition worsens or fails to improve we recommend that you receive another evaluation at the ER immediately or contact your PCP to discuss your concerns or return here.   - Follow up with your PCP or specialty clinic as directed in the next 1-2 weeks if not improved or as needed.  You can call [phone] to schedule an appointment with the appropriate provider.    If your symptoms do not improve or worsen, go to the emergency room immediately.

## 2022-04-28 NOTE — PROCEDURES
Incision & Drainage    Date/Time: 4/28/2022 9:00 AM  Performed by: Giselle Penaloza PA-C  Authorized by: Giselle Penaloza PA-C     Consent Done?:  Yes (Verbal)    Type:  Abscess (external hemorrhoid)  Body area:  Anogenital  Location details:  Perianal  Patient tolerance:  Patient tolerated the procedure well with no immediate complications    External hemorrhoid already opened on its own earlier today. Hemorrhoid contents expressed with luis massage. Pressure dressing with gauze placed over the area. Patient tolerated procedure well.

## 2022-04-28 NOTE — PATIENT INSTRUCTIONS
- Rest.    - Drink plenty of fluids.    - Acetaminophen (tylenol) or Ibuprofen (advil,motrin) as directed as needed for fever/pain. Avoid tylenol if you have a history of liver disease. Do not take ibuprofen if you have a history of GI bleeding, kidney disease, or if you take blood thinners.   - Ibuprofen dosing for adults: 400 mg by mouth every 4-6 hours as needed. Max: 2400 mg/day; Info: use lowest effective dose, shortest effective treatment duration; give w/ food if GI upset occurs.  - Tylenol dosing for adults: [By mouth route, immediate-release form] Dose: 325-1000 mg by mouth every 4-6h as needed; Max: 1 g/4h and 4 g/day from all sources. [By mouth route, extended-release form] Dose: 650-1300 mg Extended Release by mouth every 8h as needed; Max: 4 g/day from all sources.     - You must understand that you have received an Urgent Care treatment only and that you may be released before all of your medical problems are known or treated.   - You, the patient, will arrange for follow up care as instructed.   - If your condition worsens or fails to improve we recommend that you receive another evaluation at the ER immediately or contact your PCP to discuss your concerns or return here.   - Follow up with your PCP or specialty clinic as directed in the next 1-2 weeks if not improved or as needed.  You can call [phone] to schedule an appointment with the appropriate provider.    If your symptoms do not improve or worsen, go to the emergency room immediately.

## 2022-05-19 ENCOUNTER — APPOINTMENT (OUTPATIENT)
Dept: RADIOLOGY | Facility: OTHER | Age: 47
End: 2022-05-19
Attending: PODIATRIST
Payer: COMMERCIAL

## 2022-05-19 ENCOUNTER — OFFICE VISIT (OUTPATIENT)
Dept: PODIATRY | Facility: CLINIC | Age: 47
End: 2022-05-19
Payer: COMMERCIAL

## 2022-05-19 VITALS
DIASTOLIC BLOOD PRESSURE: 93 MMHG | HEIGHT: 71 IN | WEIGHT: 222 LBS | HEART RATE: 81 BPM | BODY MASS INDEX: 31.08 KG/M2 | SYSTOLIC BLOOD PRESSURE: 148 MMHG

## 2022-05-19 DIAGNOSIS — M79.671 FOOT PAIN, BILATERAL: ICD-10-CM

## 2022-05-19 DIAGNOSIS — M79.672 FOOT PAIN, BILATERAL: ICD-10-CM

## 2022-05-19 DIAGNOSIS — M77.9 CAPSULITIS: Primary | ICD-10-CM

## 2022-05-19 DIAGNOSIS — M24.573 EQUINUS CONTRACTURE OF ANKLE: ICD-10-CM

## 2022-05-19 DIAGNOSIS — M77.9 CAPSULITIS: ICD-10-CM

## 2022-05-19 PROCEDURE — 3080F DIAST BP >= 90 MM HG: CPT | Mod: CPTII,S$GLB,, | Performed by: PODIATRIST

## 2022-05-19 PROCEDURE — 3008F PR BODY MASS INDEX (BMI) DOCUMENTED: ICD-10-PCS | Mod: CPTII,S$GLB,, | Performed by: PODIATRIST

## 2022-05-19 PROCEDURE — 99214 OFFICE O/P EST MOD 30 MIN: CPT | Mod: S$GLB,,, | Performed by: PODIATRIST

## 2022-05-19 PROCEDURE — 3077F SYST BP >= 140 MM HG: CPT | Mod: CPTII,S$GLB,, | Performed by: PODIATRIST

## 2022-05-19 PROCEDURE — 99214 PR OFFICE/OUTPT VISIT, EST, LEVL IV, 30-39 MIN: ICD-10-PCS | Mod: S$GLB,,, | Performed by: PODIATRIST

## 2022-05-19 PROCEDURE — 1159F MED LIST DOCD IN RCRD: CPT | Mod: CPTII,S$GLB,, | Performed by: PODIATRIST

## 2022-05-19 PROCEDURE — 3077F PR MOST RECENT SYSTOLIC BLOOD PRESSURE >= 140 MM HG: ICD-10-PCS | Mod: CPTII,S$GLB,, | Performed by: PODIATRIST

## 2022-05-19 PROCEDURE — 3080F PR MOST RECENT DIASTOLIC BLOOD PRESSURE >= 90 MM HG: ICD-10-PCS | Mod: CPTII,S$GLB,, | Performed by: PODIATRIST

## 2022-05-19 PROCEDURE — 99999 PR PBB SHADOW E&M-EST. PATIENT-LVL III: ICD-10-PCS | Mod: PBBFAC,,, | Performed by: PODIATRIST

## 2022-05-19 PROCEDURE — 3008F BODY MASS INDEX DOCD: CPT | Mod: CPTII,S$GLB,, | Performed by: PODIATRIST

## 2022-05-19 PROCEDURE — 1159F PR MEDICATION LIST DOCUMENTED IN MEDICAL RECORD: ICD-10-PCS | Mod: CPTII,S$GLB,, | Performed by: PODIATRIST

## 2022-05-19 PROCEDURE — 73630 X-RAY EXAM OF FOOT: CPT | Mod: 26,,, | Performed by: RADIOLOGY

## 2022-05-19 PROCEDURE — 99999 PR PBB SHADOW E&M-EST. PATIENT-LVL III: CPT | Mod: PBBFAC,,, | Performed by: PODIATRIST

## 2022-05-19 PROCEDURE — 73630 X-RAY EXAM OF FOOT: CPT | Mod: TC,50

## 2022-05-19 PROCEDURE — 73630 XR FOOT COMPLETE 3 VIEW BILATERAL: ICD-10-PCS | Mod: 26,,, | Performed by: RADIOLOGY

## 2022-05-19 RX ORDER — MELOXICAM 15 MG/1
15 TABLET ORAL DAILY
Qty: 30 TABLET | Refills: 0 | Status: SHIPPED | OUTPATIENT
Start: 2022-05-19 | End: 2023-03-06

## 2022-05-19 NOTE — PROGRESS NOTES
Subjective:      Patient ID: Elyssa Richards Jr. is a 46 y.o. male.    Chief Complaint: Follow-up (L foot 4th and 5th digit went numb)    Sharp deep throbbing pain at the bottom of the forefoot right and left feet.  Gradual onset, improved well over the past month or so with gradual return over the past week or 2.  Aggravated by increased weight-bearing prolonged standing some shoes.  Good relief in the past from home stretches, otc inserts, otrhof-eet work boots.  Patient denies trauma and surgery both feet.  Relates condition diagnose prior not bothering him today due to working on shifts which are unstable    Review of Systems   Constitutional: Negative for chills, diaphoresis, fever, malaise/fatigue and night sweats.   Cardiovascular: Negative for claudication, cyanosis, leg swelling and syncope.   Skin: Negative for color change, dry skin, nail changes, rash, suspicious lesions and unusual hair distribution.   Musculoskeletal: Positive for joint pain. Negative for falls, joint swelling, muscle cramps, muscle weakness and stiffness.   Gastrointestinal: Negative for constipation, diarrhea, nausea and vomiting.   Neurological: Negative for brief paralysis, disturbances in coordination, focal weakness, numbness, paresthesias, sensory change and tremors.           Objective:      Physical Exam  Constitutional:       General: He is not in acute distress.     Appearance: He is well-developed. He is not diaphoretic.   Cardiovascular:      Pulses:           Popliteal pulses are 2+ on the right side and 2+ on the left side.        Dorsalis pedis pulses are 2+ on the right side and 2+ on the left side.        Posterior tibial pulses are 2+ on the right side and 2+ on the left side.      Comments: Capillary refill 3 seconds all toes/distal feet, all toes/both feet warm to touch.      Negative lymphadenopathy bilateral popliteal fossa and tarsal tunnel.      Negavie lower extremity edema bilateral.    Musculoskeletal:       Right ankle: No swelling, deformity, ecchymosis or lacerations. Normal range of motion. Normal pulse.      Right Achilles Tendon: Normal. No defects. Tejeda's test negative.      Comments: No current pain to palpation inferior mtpj 1-5 bilateral without evidence of trauma or infection.;    Ankle dorsiflexion decreased at <10 degrees bilateral with moderate increase with knee flexion bilateral.    Otherwise , Normal angle, base, station of gait. All ten toes without clubbing, cyanosis, or signs of ischemia.  No pain to palpation bilateral lower extremities.  Range of motion, stability, muscle strength, and muscle tone normal bilateral feet and legs.    Lymphadenopathy:      Lower Body: No right inguinal adenopathy. No left inguinal adenopathy.      Comments: Negative lymphadenopathy bilateral popliteal fossa and tarsal tunnel.    Negative lymphangitic streaking bilateral feet/ankles/legs.   Skin:     General: Skin is warm and dry.      Capillary Refill: Capillary refill takes 2 to 3 seconds.      Coloration: Skin is not pale.      Findings: No abrasion, bruising, burn, ecchymosis, erythema, laceration, lesion or rash.      Nails: There is no clubbing.      Comments:   Skin is normal age and health appropriate color, turgor, texture, and temperature bilateral lower extremities without ulceration, hyperpigmentation, discoloration, masses nodules or cords palpated.  No ecchymosis, erythema, edema, or cardinal signs of infection bilateral lower extremities.     Neurological:      Mental Status: He is alert and oriented to person, place, and time.      Sensory: No sensory deficit.      Motor: No tremor, atrophy or abnormal muscle tone.      Gait: Gait normal.      Deep Tendon Reflexes:      Reflex Scores:       Patellar reflexes are 2+ on the right side and 2+ on the left side.       Achilles reflexes are 2+ on the right side and 2+ on the left side.     Comments: Negative tinel sign to percussion sural, superficial  peroneal, deep peroneal, saphenous, and posterior tibial nerves right and left ankles and feet.      Negative moulder sign/click bilateral all intermetatarsal spaces.     Psychiatric:         Behavior: Behavior is cooperative.               Assessment:       Encounter Diagnoses   Name Primary?    Capsulitis Yes    Equinus contracture of ankle     Foot pain, bilateral          Plan:       Elyssa was seen today for follow-up.    Diagnoses and all orders for this visit:    Capsulitis  -     Ambulatory referral/consult to Physical/Occupational Therapy; Future  -     ORTHOTIC DEVICE (DME)  -     X-Ray Foot Complete Bilateral; Future    Equinus contracture of ankle  -     Ambulatory referral/consult to Physical/Occupational Therapy; Future  -     ORTHOTIC DEVICE (DME)  -     X-Ray Foot Complete Bilateral; Future    Foot pain, bilateral  -     Ambulatory referral/consult to Physical/Occupational Therapy; Future  -     ORTHOTIC DEVICE (DME)  -     X-Ray Foot Complete Bilateral; Future    Other orders  -     meloxicam (MOBIC) 15 MG tablet; Take 1 tablet (15 mg total) by mouth once daily.      I counseled the patient on his conditions, their implications and medical management.        Continue stretches, inserts, atheltic shoes/ortho-feet work boots, activity to tolerance.    Patient is ordered night splint from [employer] continuing use these nightly.    X-rays both feet weight-bearing.    Custom orthotics and physical therapy.    The patient was advised that NSAID-type medications have two very important potential side effects: gastrointestinal irritation including hemorrhage and renal injuries. He was asked to take the medication with food and to stop if he experiences any GI upset. I asked him to call for vomiting, abdominal pain or black/bloody stools. The patient expresses understanding of these issues and questions were answered.    Meloxicam        No follow-ups on file.

## 2022-05-20 ENCOUNTER — CLINICAL SUPPORT (OUTPATIENT)
Dept: REHABILITATION | Facility: HOSPITAL | Age: 47
End: 2022-05-20
Attending: PODIATRIST
Payer: COMMERCIAL

## 2022-05-20 DIAGNOSIS — M79.672 FOOT PAIN, BILATERAL: ICD-10-CM

## 2022-05-20 DIAGNOSIS — M77.9 CAPSULITIS: ICD-10-CM

## 2022-05-20 DIAGNOSIS — M79.671 FOOT PAIN, BILATERAL: ICD-10-CM

## 2022-05-20 DIAGNOSIS — M24.573 EQUINUS CONTRACTURE OF ANKLE: ICD-10-CM

## 2022-05-20 DIAGNOSIS — M25.672 DECREASED RANGE OF MOTION OF BOTH ANKLES: ICD-10-CM

## 2022-05-20 DIAGNOSIS — M25.671 DECREASED RANGE OF MOTION OF BOTH ANKLES: ICD-10-CM

## 2022-05-20 PROBLEM — M25.673 DECREASED ROM OF ANKLE: Status: ACTIVE | Noted: 2022-05-20

## 2022-05-20 PROCEDURE — 97161 PT EVAL LOW COMPLEX 20 MIN: CPT

## 2022-05-20 PROCEDURE — 97140 MANUAL THERAPY 1/> REGIONS: CPT

## 2022-05-20 NOTE — PLAN OF CARE
[facility]   Physical Therapy Initial Evaluation     Date: 5/20/2022   Name: Elyssa Richards Jr.  Clinic Number: [id number]    Therapy Diagnosis:   Encounter Diagnoses   Name Primary?    Capsulitis     Equinus contracture of ankle     Foot pain, bilateral     Decreased range of motion of both ankles      Physician: Errol Mansfield DPM    Physician Orders: PT Eval and Treat   Medical Diagnosis from Referral:   M77.9 (ICD-10-CM) - Capsulitis   M24.573 (ICD-10-CM) - Equinus contracture of ankle   M79.671,M79.672 (ICD-10-CM) - Foot pain, bilateral       Evaluation Date: 5/20/2022  Authorization Period Expiration: TBD  Plan of Care Expiration: 8/20/2022  Progress Note Due: 6/20/2022  Visit # / Visits authorized: 1/ 1   FOTO: 1/5    Precautions: Standard     Time In: 4::30  Time Out: 5:10  Total Appointment Time (timed & untimed codes): 40 minutes      SUBJECTIVE     Date of onset:  6 weeks ago    History of current condition - Elyssa reports:  Pt reports that he always wears shoes and he didn't wear shoes for one weekend. Pt reported that he tried orthotics and it helped for about 2 weeks. Pt was doing stretches but the stretches were not really helping. Pt reports that the pain gets worse throughout the day. Pt reports it hurts worse when he takes his weight off of them. Pain is the same in bilateral feet.     Falls: No    Imaging, bone scan films:     Prior Therapy: Yes  Social History:  Live with family  Occupation:   Prior Level of Function: Independent  Current Level of Function: Independent     Pain:  Current 5/10, worst 8/10, best 0/10   Location: bilateral feet   Description: Aching  Aggravating Factors: Sitting and Standing  Easing Factors: rest    Patients goals: Pt would like to not be in pain at the end of the day.      Medical History:   Past Medical History:   Diagnosis Date    GERD (gastroesophageal reflux disease)        Surgical History:   Elyssa Richards   has a  past surgical history that includes grastric sleeve; Foot fracture surgery; iye ; and Umbilical hernia repair (2007).    Medications:   Elyssa has a current medication list which includes the following prescription(s): meloxicam and omeprazole.    Allergies:   Review of patient's allergies indicates:  No Known Allergies       OBJECTIVE       Ankle Right Right Right Left Left Left Pain/Dysfunction with Movement    AROM PROM MMT AROM PROM MMT    Plantarflexion  WNL 5/5  WNL 5/5    Dorsiflexion  10 5/5  10 5/5    Inversion  30 5/5  30 5/5    Eversion  10 5/5  10 5/5        Limitation/Restriction for FOTO Foot Survey    Therapist reviewed FOTO scores for Elyssa Richards Jr. on 5/20/2022.   FOTO documents entered into [employer] - see Media section.    Limitation Score: 25%         TREATMENT     Total Treatment time (time-based codes) separate from Evaluation: 15 minutes      Elyssa received the treatments listed below:        manual therapy techniques: Joint mobilizations and Manual traction were applied to the: Bilateral Ankle for 15 minutes, including:    Posterior talocrural       PATIENT EDUCATION AND HOME EXERCISES     Education provided:   - Continue Physician HEP       ASSESSMENT     Elyssa is a 46 y.o. male referred to outpatient Physical Therapy with a medical diagnosis of   M77.9 (ICD-10-CM) - Capsulitis   M24.573 (ICD-10-CM) - Equinus contracture of ankle   M79.671,M79.672 (ICD-10-CM) - Foot pain, bilateral    Patient presents with     - Decreased Dorsiflexion Bilaterally  - Hypomobile Posterior talocrural ankle   - Decrease Eversion Bilaterally     Patient is 45 y/o male who presents to physical therapy with bilateral foot pain for the past 6 weeks. Pt has decreased ankle dorsiflexion bilaterally, decrease eversion bilaterally and hypomobile talocrural mobility posteriorly. Pt needs to increase his ankle range of motion and and talocrural mobility in order to reduce stress on plantar fascia. Pt had slight increase range  of motion in dorsiflexion after manual therapy treatment.       Patient prognosis is Good.   Patient will benefit from skilled outpatient Physical Therapy to address the deficits stated above and in the chart below, provide patient /family education, and to maximize patientt's level of independence.     Plan of care discussed with patient: Yes  Patient's spiritual, cultural and educational needs considered and patient is agreeable to the plan of care and goals as stated below:     Anticipated Barriers for therapy: None    Medical Necessity is demonstrated by the following  History  Co-morbidities and personal factors that may impact the plan of care Co-morbidities:   See Medical History    Personal Factors:   no deficits     low   Examination  Body Structures and Functions, activity limitations and participation restrictions that may impact the plan of care Body Regions:   lower extremities    Body Systems:    ROM  gait  motor control    Participation Restrictions:   Walking with wife    Activity limitations:   Learning and applying knowledge  no deficits    General Tasks and Commands  no deficits    Communication  no deficits    Mobility  no deficits    Self care  no deficits    Domestic Life  no deficits    Interactions/Relationships  no deficits    Life Areas  no deficits    Community and Social Life  no deficits         low   Clinical Presentation stable and uncomplicated low   Decision Making/ Complexity Score: low     Goals:    Short Term Goals:    1. Pt will be independent with HEP supplement PT in improving functional mobility.  2. Pt will improve Bilateral ankle dorsiflexion PROM to at least 15 Deg in order to improve gait  3. Pt will improve Bilateral ankle eversion PROM to at least 15 Deg in order to improve gait    Long Term Goals:  1. Pt will be independent with updated HEP supplement PT in improving functional mobility.  2. Pt will improve Bilateral ankle dorsiflexion PROM to at least 20 Deg in order  to improve gait  3. Pt will improve Bilateral ankle eversion PROM to at least 20 Deg in order to improve gait  4. Pt will report no pain with ambulation for 10 minutes or greater      PLAN   Plan of care Certification: 5/20/2022 to 8/20/2022    Outpatient Physical Therapy 2 times weekly for 10 weeks to include the following interventions: Electrical Stimulation Tens, IFC, Dry needling, Gait Training, Manual Therapy, Neuromuscular Re-ed, Patient Education, Therapeutic Activities and Therapeutic Exercise.     Cruz Reynolds, PT      I CERTIFY THE NEED FOR THESE SERVICES FURNISHED UNDER THIS PLAN OF TREATMENT AND WHILE UNDER MY CARE   Physician's comments:     Physician's Signature: ___________________________________________________

## 2022-05-23 NOTE — PROGRESS NOTES
"[facility]   Physical Therapy Treatment Note     Name: Elyssa Richards Jr.  Clinic Number: [id number]    Therapy Diagnosis: No diagnosis found.  Physician: No ref. provider found    Visit Date: 5/24/2022    Physician Orders: PT Eval and Treat   Medical Diagnosis from Referral:   M77.9 (ICD-10-CM) - Capsulitis   M24.573 (ICD-10-CM) - Equinus contracture of ankle   M79.671,M79.672 (ICD-10-CM) - Foot pain, bilateral         Evaluation Date: 5/20/2022  Authorization Period Expiration: TBD  Plan of Care Expiration: 8/20/2022  Progress Note Due: 6/20/2022  Visit # / Visits authorized: 1/ 1 , 1/12  FOTO: 1/5    PTA Visit #: 1/5     Precautions: Standard    Time In: 7:02   Time Out: 7:42   Total Billable Time: 40 minutes    Subjective     Pt reports: that he has been feeling a lot better with barely any notations of pain. Pt states that he does his stretches at least once a day.  He was compliant with home exercise program.  Response to previous treatment: last session was initial eval  Functional change: none at this time     Pain: 0/10  Location: n/a     Objective     Elyssa received therapeutic exercises to develop strength, endurance, ROM and flexibility for 32 minutes including:  Recumbent bike 5 min   GSS on wedge 3x30"  Seated arch doming 2x10   Toe yoga x10   Towel curls 2 min   PF stretch 10" x5   Ankle 4 way GTB 10x ea         Elyssa received the following manual therapy techniques:  were applied to the: left ankle for 8 minutes, including:  Posterior talocrural   Distraction c/ figure 8        Home Exercises Provided and Patient Education Provided     Education provided:   - HEP    Written Home Exercises Provided: yes.  Exercises were reviewed and Elyssa was able to demonstrate them prior to the end of the session.  Elyssa demonstrated good  understanding of the education provided.     See EMR under Patient Instructions for exercises provided 5/24/2022.    Assessment     Initiated therex program " following pt's initial evaluation. Pt with no adverse effects throughout session, however required minimal cuing for arch doming and great toe extension. Pt unable to initial isolate great toe, however last few reps were of desirable form. Manual therapy techniques performed in order to increase DF ROM and overall ankle mobility. Will continue to progress pt per his tolerance and POC.     Elyssa Is progressing well towards his goals.   Pt prognosis is Good.     Pt will continue to benefit from skilled outpatient physical therapy to address the deficits listed in the problem list box on initial evaluation, provide pt/family education and to maximize pt's level of independence in the home and community environment.     Pt's spiritual, cultural and educational needs considered and pt agreeable to plan of care and goals.     Anticipated barriers to physical therapy: none    Goals:   Short Term Goals:     1. Pt will be independent with HEP supplement PT in improving functional mobility.  2. Pt will improve Bilateral ankle dorsiflexion PROM to at least 15 Deg in order to improve gait  3. Pt will improve Bilateral ankle eversion PROM to at least 15 Deg in order to improve gait     Long Term Goals:  1. Pt will be independent with updated HEP supplement PT in improving functional mobility.  2. Pt will improve Bilateral ankle dorsiflexion PROM to at least 20 Deg in order to improve gait  3. Pt will improve Bilateral ankle eversion PROM to at least 20 Deg in order to improve gait  4. Pt will report no pain with ambulation for 10 minutes or greater      Plan   Plan of care Certification: 5/20/2022 to 8/20/2022       Continue with current POC.     Hazel Macario, PTA

## 2022-05-24 ENCOUNTER — CLINICAL SUPPORT (OUTPATIENT)
Dept: REHABILITATION | Facility: HOSPITAL | Age: 47
End: 2022-05-24
Attending: PODIATRIST
Payer: COMMERCIAL

## 2022-05-24 DIAGNOSIS — M25.673 DECREASED RANGE OF MOTION OF ANKLE, UNSPECIFIED LATERALITY: Primary | ICD-10-CM

## 2022-05-24 DIAGNOSIS — M25.672 DECREASED RANGE OF MOTION OF BOTH ANKLES: ICD-10-CM

## 2022-05-24 DIAGNOSIS — M25.671 DECREASED RANGE OF MOTION OF BOTH ANKLES: ICD-10-CM

## 2022-05-24 PROCEDURE — 97110 THERAPEUTIC EXERCISES: CPT | Mod: CQ

## 2022-05-24 PROCEDURE — 97140 MANUAL THERAPY 1/> REGIONS: CPT | Mod: CQ

## 2022-05-26 ENCOUNTER — CLINICAL SUPPORT (OUTPATIENT)
Dept: REHABILITATION | Facility: HOSPITAL | Age: 47
End: 2022-05-26
Attending: PODIATRIST
Payer: COMMERCIAL

## 2022-05-26 DIAGNOSIS — M25.673 DECREASED RANGE OF MOTION OF ANKLE, UNSPECIFIED LATERALITY: Primary | ICD-10-CM

## 2022-05-26 PROCEDURE — 97110 THERAPEUTIC EXERCISES: CPT

## 2022-05-26 PROCEDURE — 97140 MANUAL THERAPY 1/> REGIONS: CPT

## 2022-05-26 NOTE — PROGRESS NOTES
"[facility]   Physical Therapy Treatment Note     Name: Elyssa Richards Jr.  Clinic Number: [id number]    Therapy Diagnosis: No diagnosis found.  Physician: Errol Mansfield DPM    Visit Date: 5/26/2022    Physician Orders: PT Eval and Treat   Medical Diagnosis from Referral:   M77.9 (ICD-10-CM) - Capsulitis   M24.573 (ICD-10-CM) - Equinus contracture of ankle   M79.671,M79.672 (ICD-10-CM) - Foot pain, bilateral         Evaluation Date: 5/20/2022  Authorization Period Expiration: TBD  Plan of Care Expiration: 8/20/2022  Progress Note Due: 6/20/2022  Visit # / Visits authorized: 1/ 1 , 1/12  FOTO: 1/5    PTA Visit #: 1/5     Precautions: Standard    Time In: 7:45 am  Time Out: 8:38 am  Total Billable Time: 53 minutes    Subjective     Pt reports:that it only starts to hurt in his feet after a full day of work on his feet, but he does not wake up with the pain.  He was compliant with home exercise program.  Response to previous treatment: initial eval  Functional change: none at this time     Pain: 0/10  Location: n/a     Objective     Elyssa received therapeutic exercises to develop strength, endurance, ROM and flexibility for 40 minutes including:  Recumbent bike 5 min   GSS on wedge 3x30"  Soleus on wedge 3x30s  Seated arch doming 2x15  Toe yoga 2x10  Towel curls 2 min   PF stretch 10" x5   Ankle 4 way GTB 10x ea   Heel raises 2x10  ABC's 2x        Elyssa received the following manual therapy techniques:  were applied to the: left ankle for 13 minutes, including:  Posterior talocrural   Distraction c/ figure 8  WB DF mob         Home Exercises Provided and Patient Education Provided     Education provided:   - HEP    Written Home Exercises Provided: yes.  Exercises were reviewed and Elyssa was able to demonstrate them prior to the end of the session.  Elyssa demonstrated good  understanding of the education provided.     See EMR under Patient Instructions for exercises provided " 5/24/2022.    Assessment     Continued therex with progression aimed to improve ankle ROM, which pt tolerated with no adverse effects. Minimal cueing necessary throughout therex session. Pt unable to initial isolate great toe in toe yoga, however last few reps were of desirable form. Manual therapy techniques performed in order to increase DF ROM and overall ankle mobility. Will continue to progress pt per his tolerance and POC.     Elyssa Is progressing well towards his goals.   Pt prognosis is Good.     Pt will continue to benefit from skilled outpatient physical therapy to address the deficits listed in the problem list box on initial evaluation, provide pt/family education and to maximize pt's level of independence in the home and community environment.     Pt's spiritual, cultural and educational needs considered and pt agreeable to plan of care and goals.     Anticipated barriers to physical therapy: none    Goals:   Short Term Goals:     1. Pt will be independent with HEP supplement PT in improving functional mobility.  2. Pt will improve Bilateral ankle dorsiflexion PROM to at least 15 Deg in order to improve gait  3. Pt will improve Bilateral ankle eversion PROM to at least 15 Deg in order to improve gait     Long Term Goals:  1. Pt will be independent with updated HEP supplement PT in improving functional mobility.  2. Pt will improve Bilateral ankle dorsiflexion PROM to at least 20 Deg in order to improve gait  3. Pt will improve Bilateral ankle eversion PROM to at least 20 Deg in order to improve gait  4. Pt will report no pain with ambulation for 10 minutes or greater      Plan   Plan of care Certification: 5/20/2022 to 8/20/2022       Continue with current POC.     Jessica Calix, LANDON Vázquez, PT

## 2022-05-30 NOTE — PROGRESS NOTES
"[facility]   Physical Therapy Treatment Note     Name: Elyssa Richards Jr.  Clinic Number: [id number]    Therapy Diagnosis:   Encounter Diagnosis   Name Primary?    Decreased range of motion of ankle, unspecified laterality Yes     Physician: Errol Mansfield DPM    Visit Date: 5/31/2022    Physician Orders: PT Eval and Treat   Medical Diagnosis from Referral:   M77.9 (ICD-10-CM) - Capsulitis   M24.573 (ICD-10-CM) - Equinus contracture of ankle   M79.671,M79.672 (ICD-10-CM) - Foot pain, bilateral         Evaluation Date: 5/20/2022  Authorization Period Expiration: TBD  Plan of Care Expiration: 8/20/2022  Progress Note Due: 6/20/2022  Visit # / Visits authorized: 1/ 1 , 3/12  FOTO: 3/5    PTA Visit #: 1/5     Precautions: Standard    Time In: 6:24 am  Time Out: 7:10 am  Total Billable Time: 46 minutes    Subjective     Pt reports: that during the day he doesn't get much pain at all, however towards the end of the day he starts getting some minor numbness  He was compliant with home exercise program.  Response to previous treatment: no adverse effects   Functional change: none at this time     Pain: 0/10  Location: n/a     Objective     Elyssa received therapeutic exercises to develop strength, endurance, ROM and flexibility for 37 minutes including:  Recumbent bike 5 min   GSS on wedge 3x30"  Soleus on wedge 3x30s  Seated arch doming 2x15  Great toe extension stretch 10" 5x each  Towel curls 2 min   Heel raises 2x10  Toe yoga 2x10  PF stretch 10" x5   Ankle 4 way GTB 15x ea   ABC's 1x        Elyssa received the following manual therapy techniques:  were applied to the: left ankle for 9 minutes, including:  Posterior talocrural   Distraction c/ figure 8  WB DF mob         Home Exercises Provided and Patient Education Provided     Education provided:   - HEP    Written Home Exercises Provided: yes.  Exercises were reviewed and Elyssa was able to demonstrate them prior to the end of the session.  Elyssa " demonstrated good  understanding of the education provided.     See EMR under Patient Instructions for exercises provided 5/24/2022.    Assessment     Pt exhibited tolerance to all therex performed today, including the addition of great toe extensor stretch. Pt given minimal verbal cues for proper form during foot intrinsic activities. Gross assessment revealed very minimal deficits in left ankle DF compared to right ankle. Will continue to progress pt per his tolerance and POC during upcoming sessions.     Elyssa Is progressing well towards his goals.   Pt prognosis is Good.     Pt will continue to benefit from skilled outpatient physical therapy to address the deficits listed in the problem list box on initial evaluation, provide pt/family education and to maximize pt's level of independence in the home and community environment.     Pt's spiritual, cultural and educational needs considered and pt agreeable to plan of care and goals.     Anticipated barriers to physical therapy: none    Goals:   Short Term Goals:     1. Pt will be independent with HEP supplement PT in improving functional mobility.  2. Pt will improve Bilateral ankle dorsiflexion PROM to at least 15 Deg in order to improve gait  3. Pt will improve Bilateral ankle eversion PROM to at least 15 Deg in order to improve gait     Long Term Goals:  1. Pt will be independent with updated HEP supplement PT in improving functional mobility.  2. Pt will improve Bilateral ankle dorsiflexion PROM to at least 20 Deg in order to improve gait  3. Pt will improve Bilateral ankle eversion PROM to at least 20 Deg in order to improve gait  4. Pt will report no pain with ambulation for 10 minutes or greater      Plan   Plan of care Certification: 5/20/2022 to 8/20/2022       Continue with current POC.       Hazel Macario, PTA

## 2022-05-31 ENCOUNTER — CLINICAL SUPPORT (OUTPATIENT)
Dept: REHABILITATION | Facility: HOSPITAL | Age: 47
End: 2022-05-31
Attending: PODIATRIST
Payer: COMMERCIAL

## 2022-05-31 DIAGNOSIS — M25.673 DECREASED RANGE OF MOTION OF ANKLE, UNSPECIFIED LATERALITY: Primary | ICD-10-CM

## 2022-05-31 PROCEDURE — 97140 MANUAL THERAPY 1/> REGIONS: CPT | Mod: CQ

## 2022-05-31 PROCEDURE — 97110 THERAPEUTIC EXERCISES: CPT | Mod: CQ

## 2022-06-03 ENCOUNTER — CLINICAL SUPPORT (OUTPATIENT)
Dept: REHABILITATION | Facility: HOSPITAL | Age: 47
End: 2022-06-03
Attending: PODIATRIST
Payer: COMMERCIAL

## 2022-06-03 DIAGNOSIS — M25.673 DECREASED RANGE OF MOTION OF ANKLE, UNSPECIFIED LATERALITY: Primary | ICD-10-CM

## 2022-06-03 PROCEDURE — 97140 MANUAL THERAPY 1/> REGIONS: CPT | Mod: CQ

## 2022-06-03 PROCEDURE — 97110 THERAPEUTIC EXERCISES: CPT | Mod: CQ

## 2022-06-03 NOTE — PROGRESS NOTES
"[facility]   Physical Therapy Treatment Note     Name: Elyssa Richards Jr.  Clinic Number: [id number]    Therapy Diagnosis:   Encounter Diagnosis   Name Primary?    Decreased range of motion of ankle, unspecified laterality Yes     Physician: Errol Mansfield DPM    Visit Date: 6/3/2022    Physician Orders: PT Eval and Treat   Medical Diagnosis from Referral:   M77.9 (ICD-10-CM) - Capsulitis   M24.573 (ICD-10-CM) - Equinus contracture of ankle   M79.671,M79.672 (ICD-10-CM) - Foot pain, bilateral         Evaluation Date: 5/20/2022  Authorization Period Expiration: TBD  Plan of Care Expiration: 8/20/2022  Progress Note Due: 6/20/2022  Visit # / Visits authorized: 1/ 1 , 4/12  FOTO: 4/5    PTA Visit #: 2/5     Precautions: Standard    Time In: 2:38 pm  Time Out: 3:27 pm  Total Billable Time: 49 minutes    Subjective     Pt reports: that he feels his feet have been getting sore at the end of the day, however does not experience the pain like he did when he first began therapy.   He was compliant with home exercise program.  Response to previous treatment: no adverse effects   Functional change: none at this time     Pain: 0/10  Location: n/a     Objective     Elyssa received therapeutic exercises to develop strength, endurance, ROM and flexibility for 31 minutes including:  Recumbent bike 5 min   GSS on wedge 3x30"  Soleus on wedge 3x30s  Seated arch doming 2x15  Great toe extension stretch 10" 5x each  Towel curls 2 min   Heel raises 2x10  Toe yoga 2x10  PF stretch 10" x5   Ankle 4 way GTB 15x ea   ABC's 1x      neuromuscular re-education activities to improve: Balance, Coordination and Proprioception for 8 minutes. The following activities were included:  SLS on airex 3x30" BLE   Tandem stance on floor 2x30" RFF/LFF       Elyssa received the following manual therapy techniques:  were applied to the: left ankle for 10 minutes, including:  Posterior talocrural   Distraction c/ figure 8  WB DF mob " (not performed)         Home Exercises Provided and Patient Education Provided     Education provided:   - HEP    Written Home Exercises Provided: yes.  Exercises were reviewed and Elyssa was able to demonstrate them prior to the end of the session.  Elyssa demonstrated good  understanding of the education provided.     See EMR under Patient Instructions for exercises provided 5/24/2022.    Assessment     Pt exhibited tolerance to all therex performed this session. Also began to incorporate NMR this session to work towards ankle and foot intrinsic stability. No LOB noted during NMR activities, however some wavering noted. Manual therapy techniques continued this session to further improve ankle dorsiflexion.     Elyssa Is progressing well towards his goals.   Pt prognosis is Good.     Pt will continue to benefit from skilled outpatient physical therapy to address the deficits listed in the problem list box on initial evaluation, provide pt/family education and to maximize pt's level of independence in the home and community environment.     Pt's spiritual, cultural and educational needs considered and pt agreeable to plan of care and goals.     Anticipated barriers to physical therapy: none    Goals:   Short Term Goals:     1. Pt will be independent with HEP supplement PT in improving functional mobility.  2. Pt will improve Bilateral ankle dorsiflexion PROM to at least 15 Deg in order to improve gait  3. Pt will improve Bilateral ankle eversion PROM to at least 15 Deg in order to improve gait     Long Term Goals:  1. Pt will be independent with updated HEP supplement PT in improving functional mobility.  2. Pt will improve Bilateral ankle dorsiflexion PROM to at least 20 Deg in order to improve gait  3. Pt will improve Bilateral ankle eversion PROM to at least 20 Deg in order to improve gait  4. Pt will report no pain with ambulation for 10 minutes or greater      Plan   Plan of care Certification: 5/20/2022 to 8/20/2022        Continue with current POC.       Hazel Macario PTA

## 2022-06-13 ENCOUNTER — CLINICAL SUPPORT (OUTPATIENT)
Dept: REHABILITATION | Facility: HOSPITAL | Age: 47
End: 2022-06-13
Attending: PODIATRIST
Payer: COMMERCIAL

## 2022-06-13 DIAGNOSIS — M25.671 DECREASED RANGE OF MOTION OF BOTH ANKLES: Primary | ICD-10-CM

## 2022-06-13 DIAGNOSIS — M25.672 DECREASED RANGE OF MOTION OF BOTH ANKLES: Primary | ICD-10-CM

## 2022-06-13 PROCEDURE — 97110 THERAPEUTIC EXERCISES: CPT

## 2022-06-13 PROCEDURE — 97140 MANUAL THERAPY 1/> REGIONS: CPT

## 2022-06-13 NOTE — PROGRESS NOTES
"[facility]   Physical Therapy Treatment Note     Name: Elyssa Richards Jr.  Clinic Number: [id number]    Therapy Diagnosis:   Encounter Diagnosis   Name Primary?    Decreased range of motion of both ankles Yes     Physician: Errol Mansfield DPM    Visit Date: 6/13/2022    Physician Orders: PT Eval and Treat   Medical Diagnosis from Referral:   M77.9 (ICD-10-CM) - Capsulitis   M24.573 (ICD-10-CM) - Equinus contracture of ankle   M79.671,M79.672 (ICD-10-CM) - Foot pain, bilateral         Evaluation Date: 5/20/2022  Authorization Period Expiration: 12/31/2022  Plan of Care Expiration: 8/20/2022  Progress Note Due: 6/20/2022  Visit # / Visits authorized: 1/ 1 , 5/12  FOTO: 5/5    PTA Visit #: 2/5     Precautions: Standard    Time In: 7:01 pm  Time Out: 7:46 pm  Total Billable Time: 45 minutes    Subjective     Pt reports: that  his feet are not as sore after work as the were before starting therapy. Pt that toe yoga and arch support exercises are helping the most.    He was compliant with home exercise program.  Response to previous treatment: no adverse effects   Functional change: none at this time     Pain: 0/10  Location: n/a     Objective     Elyssa received therapeutic exercises to develop strength, endurance, ROM and flexibility for 30 minutes including:    Recumbent bike 5 min   Leg press Calf raises eccentrics 2x10 2 cord, Unilateral 1c 1x10  GSS on leg press 2x30"  Standing arch doming 2x15  Great toe extension stretch 10" 5x each  Standing Towel curls 2 min   Heel raises 2x10  Standing Toe yoga 2x10  PF stretch 10" x5   Ankle 4 way GTB 15x ea   ABC's 1x  np  Soleus on wedge 3x30s  neuromuscular re-education activities to improve: Balance, Coordination and Proprioception for 0 minutes. The following activities were included:  SLS on airex 3x30" BLE   Tandem stance on floor 2x30" RFF/LFF       Elyssa received the following manual therapy techniques:  were applied to the: left ankle for 15 " minutes, including:    Posterior talocrural   Distraction c/ figure 8  WB DF mob         Home Exercises Provided and Patient Education Provided     Education provided:   - HEP    Written Home Exercises Provided: yes.  Exercises were reviewed and Elyssa was able to demonstrate them prior to the end of the session.  Elyssa demonstrated good  understanding of the education provided.     See EMR under Patient Instructions for exercises provided 5/24/2022.    Assessment     Pt  was able to tolerate exercises in standing to more mimic his daily life. Pt reported having increased tightness in left calf greater than right when performing leg press calf raises. Pt had muscle fatigue in calfs and feet after treatment session but had no pain. Plan to continue to increase intensity level of exercises in standing and monitor pt tolerance.     Elyssa Is progressing well towards his goals.   Pt prognosis is Good.     Pt will continue to benefit from skilled outpatient physical therapy to address the deficits listed in the problem list box on initial evaluation, provide pt/family education and to maximize pt's level of independence in the home and community environment.     Pt's spiritual, cultural and educational needs considered and pt agreeable to plan of care and goals.     Anticipated barriers to physical therapy: none    Goals:   Short Term Goals:     1. Pt will be independent with HEP supplement PT in improving functional mobility.  2. Pt will improve Bilateral ankle dorsiflexion PROM to at least 15 Deg in order to improve gait  3. Pt will improve Bilateral ankle eversion PROM to at least 15 Deg in order to improve gait     Long Term Goals:  1. Pt will be independent with updated HEP supplement PT in improving functional mobility.  2. Pt will improve Bilateral ankle dorsiflexion PROM to at least 20 Deg in order to improve gait  3. Pt will improve Bilateral ankle eversion PROM to at least 20 Deg in order to improve gait  4. Pt will  report no pain with ambulation for 10 minutes or greater      Plan   Plan of care Certification: 5/20/2022 to 8/20/2022       Continue with current POC.       Cruz Reynolds, PT

## 2022-06-15 ENCOUNTER — CLINICAL SUPPORT (OUTPATIENT)
Dept: REHABILITATION | Facility: HOSPITAL | Age: 47
End: 2022-06-15
Attending: PODIATRIST
Payer: COMMERCIAL

## 2022-06-15 DIAGNOSIS — M25.672 DECREASED RANGE OF MOTION OF BOTH ANKLES: Primary | ICD-10-CM

## 2022-06-15 DIAGNOSIS — M25.671 DECREASED RANGE OF MOTION OF BOTH ANKLES: Primary | ICD-10-CM

## 2022-06-15 PROCEDURE — 97110 THERAPEUTIC EXERCISES: CPT

## 2022-06-15 PROCEDURE — 97140 MANUAL THERAPY 1/> REGIONS: CPT

## 2022-06-15 NOTE — PROGRESS NOTES
"[facility]   Physical Therapy Treatment Note     Name: Elyssa Richards Jr.  Clinic Number: [id number]    Therapy Diagnosis:   Encounter Diagnosis   Name Primary?    Decreased range of motion of both ankles Yes     Physician: Errol Mansfield DPM    Visit Date: 6/15/2022    Physician Orders: PT Eval and Treat   Medical Diagnosis from Referral:   M77.9 (ICD-10-CM) - Capsulitis   M24.573 (ICD-10-CM) - Equinus contracture of ankle   M79.671,M79.672 (ICD-10-CM) - Foot pain, bilateral         Evaluation Date: 5/20/2022  Authorization Period Expiration: 12/31/2022  Plan of Care Expiration: 8/20/2022  Progress Note Due: 6/20/2022  Visit # / Visits authorized: 1/ 1 , 6/12  FOTO: 5/5 0/5    PTA Visit #: 2/5     Precautions: Standard    Time In: 7:03 pm  Time Out: 7:48 pm  Total Billable Time: 45 minutes    Subjective     Pt reports: that he performed a good amount of standing after last treatment session and come of his toes were numb following that.    He was compliant with home exercise program.  Response to previous treatment: no adverse effects   Functional change: none at this time     Pain: 0/10  Location: n/a     Objective     Elyssa received therapeutic exercises to develop strength, endurance, ROM and flexibility for 30 minutes including:    Recumbent bike 5 min   LAQ 30x 3#  Clams RTB 30x each  Munday transfers 2 min x 2  GSS on Wedge 2x30"  Standing arch doming 2x15  Heel raises 2x10    NP  Leg press Calf raises eccentrics 2x10 2 cord, Unilateral 1c 1x10  Great toe extension stretch 10" 5x each  Standing Towel curls 2 min   Standing Toe yoga 2x10  PF stretch 10" x5   Ankle 4 way GTB 15x ea   ABC's 1x  Soleus on wedge 3x30s  neuromuscular re-education activities to improve: Balance, Coordination and Proprioception for 0 minutes. The following activities were included:  SLS on airex 3x30" BLE   Tandem stance on floor 2x30" RFF/LFF       Elyssa received the following manual therapy techniques:  " were applied to the: left ankle for 15 minutes, including:    Posterior talocrural   Distraction c/ figure 8  WB DF mob         Home Exercises Provided and Patient Education Provided     Education provided:   - HEP    Written Home Exercises Provided: yes.  Exercises were reviewed and Elyssa was able to demonstrate them prior to the end of the session.  Elyssa demonstrated good  understanding of the education provided.     See EMR under Patient Instructions for exercises provided 5/24/2022.    Assessment     Pt   reported that his feet felt sore/ muscle fatigue in his bilateral arch after treatment session. Pt has soreness and numbness following last treatment session therefore leg press calf raises were held until next session. Pt performed hip and knee strengthening exercises due to pain complaint of pain in those areas and possible correlation to foot pain.     Elyssa Is progressing well towards his goals.   Pt prognosis is Good.     Pt will continue to benefit from skilled outpatient physical therapy to address the deficits listed in the problem list box on initial evaluation, provide pt/family education and to maximize pt's level of independence in the home and community environment.     Pt's spiritual, cultural and educational needs considered and pt agreeable to plan of care and goals.     Anticipated barriers to physical therapy: none    Goals:   Short Term Goals:     1. Pt will be independent with HEP supplement PT in improving functional mobility.  2. Pt will improve Bilateral ankle dorsiflexion PROM to at least 15 Deg in order to improve gait  3. Pt will improve Bilateral ankle eversion PROM to at least 15 Deg in order to improve gait     Long Term Goals:  1. Pt will be independent with updated HEP supplement PT in improving functional mobility.  2. Pt will improve Bilateral ankle dorsiflexion PROM to at least 20 Deg in order to improve gait  3. Pt will improve Bilateral ankle eversion PROM to at least 20 Deg in  order to improve gait  4. Pt will report no pain with ambulation for 10 minutes or greater      Plan   Plan of care Certification: 5/20/2022 to 8/20/2022       Continue with current POC.       Cruz Reynolds, PT

## 2022-06-24 ENCOUNTER — DOCUMENTATION ONLY (OUTPATIENT)
Dept: REHABILITATION | Facility: HOSPITAL | Age: 47
End: 2022-06-24
Payer: COMMERCIAL

## 2022-06-24 NOTE — PROGRESS NOTES
PT/PTA met face to face to discuss pt's treatment plan and progress towards established goals. Pt will be seen by a physical therapist minimally every 6th visit or every 30 days.    Hazel Macario PTA    Face to Face PTA Conference performed with Hazel Macario PTA regarding patient's current status, overall progress, and plan of care.    Cruz Reynolds, PT

## 2022-06-30 ENCOUNTER — OFFICE VISIT (OUTPATIENT)
Dept: PODIATRY | Facility: CLINIC | Age: 47
End: 2022-06-30
Payer: COMMERCIAL

## 2022-06-30 VITALS
BODY MASS INDEX: 31.08 KG/M2 | DIASTOLIC BLOOD PRESSURE: 91 MMHG | WEIGHT: 222 LBS | HEIGHT: 71 IN | HEART RATE: 83 BPM | SYSTOLIC BLOOD PRESSURE: 157 MMHG

## 2022-06-30 DIAGNOSIS — M79.672 FOOT PAIN, BILATERAL: ICD-10-CM

## 2022-06-30 DIAGNOSIS — M79.671 FOOT PAIN, BILATERAL: ICD-10-CM

## 2022-06-30 DIAGNOSIS — M24.573 EQUINUS CONTRACTURE OF ANKLE: ICD-10-CM

## 2022-06-30 DIAGNOSIS — M77.9 CAPSULITIS: Primary | ICD-10-CM

## 2022-06-30 PROCEDURE — 3080F DIAST BP >= 90 MM HG: CPT | Mod: CPTII,S$GLB,, | Performed by: PODIATRIST

## 2022-06-30 PROCEDURE — 3080F PR MOST RECENT DIASTOLIC BLOOD PRESSURE >= 90 MM HG: ICD-10-PCS | Mod: CPTII,S$GLB,, | Performed by: PODIATRIST

## 2022-06-30 PROCEDURE — 3077F PR MOST RECENT SYSTOLIC BLOOD PRESSURE >= 140 MM HG: ICD-10-PCS | Mod: CPTII,S$GLB,, | Performed by: PODIATRIST

## 2022-06-30 PROCEDURE — 3077F SYST BP >= 140 MM HG: CPT | Mod: CPTII,S$GLB,, | Performed by: PODIATRIST

## 2022-06-30 PROCEDURE — 99214 OFFICE O/P EST MOD 30 MIN: CPT | Mod: S$GLB,,, | Performed by: PODIATRIST

## 2022-06-30 PROCEDURE — 99999 PR PBB SHADOW E&M-EST. PATIENT-LVL III: ICD-10-PCS | Mod: PBBFAC,,, | Performed by: PODIATRIST

## 2022-06-30 PROCEDURE — 1159F MED LIST DOCD IN RCRD: CPT | Mod: CPTII,S$GLB,, | Performed by: PODIATRIST

## 2022-06-30 PROCEDURE — 1159F PR MEDICATION LIST DOCUMENTED IN MEDICAL RECORD: ICD-10-PCS | Mod: CPTII,S$GLB,, | Performed by: PODIATRIST

## 2022-06-30 PROCEDURE — 1160F RVW MEDS BY RX/DR IN RCRD: CPT | Mod: CPTII,S$GLB,, | Performed by: PODIATRIST

## 2022-06-30 PROCEDURE — 3008F BODY MASS INDEX DOCD: CPT | Mod: CPTII,S$GLB,, | Performed by: PODIATRIST

## 2022-06-30 PROCEDURE — 3008F PR BODY MASS INDEX (BMI) DOCUMENTED: ICD-10-PCS | Mod: CPTII,S$GLB,, | Performed by: PODIATRIST

## 2022-06-30 PROCEDURE — 99214 PR OFFICE/OUTPT VISIT, EST, LEVL IV, 30-39 MIN: ICD-10-PCS | Mod: S$GLB,,, | Performed by: PODIATRIST

## 2022-06-30 PROCEDURE — 99999 PR PBB SHADOW E&M-EST. PATIENT-LVL III: CPT | Mod: PBBFAC,,, | Performed by: PODIATRIST

## 2022-06-30 PROCEDURE — 1160F PR REVIEW ALL MEDS BY PRESCRIBER/CLIN PHARMACIST DOCUMENTED: ICD-10-PCS | Mod: CPTII,S$GLB,, | Performed by: PODIATRIST

## 2022-06-30 RX ORDER — LIDOCAINE HYDROCHLORIDE 20 MG/ML
JELLY TOPICAL
Qty: 30 ML | Refills: 2 | Status: SHIPPED | OUTPATIENT
Start: 2022-06-30 | End: 2023-03-06

## 2022-06-30 NOTE — PROGRESS NOTES
Subjective:      Patient ID: Elyssa Richards Jr. is a 47 y.o. male.    Chief Complaint: Numbness (Left foot)    Sharp deep throbbing pain at the bottom of the forefoot right and left feet.  Gradual onset, improved well over the past month or so with gradual return over the past week or 2.  Aggravated by increased weight-bearing prolonged standing some shoes.  Good relief in the past from home stretches, otc inserts, otrhof-eet work boots.   PT helped well.  Did not fill RX custom orthotics, did not get night splint. Patient denies trauma and surgery both feet.  Relates condition diagnose prior not bothering him today due to working on shifts which are unstable    xrays negative acute injury. Hx 5th met fx right.    Review of Systems   Constitutional: Negative for chills, diaphoresis, fever, malaise/fatigue and night sweats.   Cardiovascular: Negative for claudication, cyanosis, leg swelling and syncope.   Skin: Negative for color change, dry skin, nail changes, rash, suspicious lesions and unusual hair distribution.   Musculoskeletal: Positive for joint pain. Negative for falls, joint swelling, muscle cramps, muscle weakness and stiffness.   Gastrointestinal: Negative for constipation, diarrhea, nausea and vomiting.   Neurological: Negative for brief paralysis, disturbances in coordination, focal weakness, numbness, paresthesias, sensory change and tremors.           Objective:      Physical Exam  Constitutional:       General: He is not in acute distress.     Appearance: He is well-developed. He is not diaphoretic.   Cardiovascular:      Pulses:           Popliteal pulses are 2+ on the right side and 2+ on the left side.        Dorsalis pedis pulses are 2+ on the right side and 2+ on the left side.        Posterior tibial pulses are 2+ on the right side and 2+ on the left side.      Comments: Capillary refill 3 seconds all toes/distal feet, all toes/both feet warm to touch.      Negative lymphadenopathy bilateral  popliteal fossa and tarsal tunnel.      Negavie lower extremity edema bilateral.    Musculoskeletal:      Right ankle: No swelling, deformity, ecchymosis or lacerations. Normal range of motion. Normal pulse.      Right Achilles Tendon: Normal. No defects. Tejeda's test negative.      Comments: No current pain to palpation inferior mtpj 1-5 bilateral without evidence of trauma or infection.;    Ankle dorsiflexion decreased at <10 degrees bilateral with moderate increase with knee flexion bilateral.    Otherwise , Normal angle, base, station of gait. All ten toes without clubbing, cyanosis, or signs of ischemia.  No pain to palpation bilateral lower extremities.  Range of motion, stability, muscle strength, and muscle tone normal bilateral feet and legs.    Lymphadenopathy:      Lower Body: No right inguinal adenopathy. No left inguinal adenopathy.      Comments: Negative lymphadenopathy bilateral popliteal fossa and tarsal tunnel.    Negative lymphangitic streaking bilateral feet/ankles/legs.   Skin:     General: Skin is warm and dry.      Capillary Refill: Capillary refill takes 2 to 3 seconds.      Coloration: Skin is not pale.      Findings: No abrasion, bruising, burn, ecchymosis, erythema, laceration, lesion or rash.      Nails: There is no clubbing.      Comments:   Skin is normal age and health appropriate color, turgor, texture, and temperature bilateral lower extremities without ulceration, hyperpigmentation, discoloration, masses nodules or cords palpated.  No ecchymosis, erythema, edema, or cardinal signs of infection bilateral lower extremities.     Neurological:      Mental Status: He is alert and oriented to person, place, and time.      Sensory: No sensory deficit.      Motor: No tremor, atrophy or abnormal muscle tone.      Gait: Gait normal.      Deep Tendon Reflexes:      Reflex Scores:       Patellar reflexes are 2+ on the right side and 2+ on the left side.       Achilles reflexes are 2+ on the  right side and 2+ on the left side.     Comments: Negative tinel sign to percussion sural, superficial peroneal, deep peroneal, saphenous, and posterior tibial nerves right and left ankles and feet.      Negative moulder sign/click bilateral all intermetatarsal spaces.     Psychiatric:         Behavior: Behavior is cooperative.               Assessment:       Encounter Diagnoses   Name Primary?    Capsulitis Yes    Equinus contracture of ankle     Foot pain, bilateral          Plan:       Elyssa was seen today for numbness.    Diagnoses and all orders for this visit:    Capsulitis    Equinus contracture of ankle    Foot pain, bilateral      I counseled the patient on his conditions, their implications and medical management.    Continue stretches, inserts, atheltic shoes/ortho-feet work boots, activity to tolerance.    Patient is ordered night splint from [employer] continuing use these nightly.    Custom orthotics and physical therapy.              No follow-ups on file.

## 2022-07-11 ENCOUNTER — CLINICAL SUPPORT (OUTPATIENT)
Dept: REHABILITATION | Facility: HOSPITAL | Age: 47
End: 2022-07-11
Attending: PODIATRIST
Payer: COMMERCIAL

## 2022-07-11 DIAGNOSIS — M25.671 DECREASED RANGE OF MOTION OF BOTH ANKLES: Primary | ICD-10-CM

## 2022-07-11 DIAGNOSIS — M25.672 DECREASED RANGE OF MOTION OF BOTH ANKLES: Primary | ICD-10-CM

## 2022-07-11 DIAGNOSIS — M77.9 CAPSULITIS: ICD-10-CM

## 2022-07-11 DIAGNOSIS — M79.671 FOOT PAIN, BILATERAL: ICD-10-CM

## 2022-07-11 DIAGNOSIS — M24.573 EQUINUS CONTRACTURE OF ANKLE: ICD-10-CM

## 2022-07-11 DIAGNOSIS — M79.672 FOOT PAIN, BILATERAL: ICD-10-CM

## 2022-07-11 PROCEDURE — 97110 THERAPEUTIC EXERCISES: CPT | Performed by: PHYSICAL THERAPIST

## 2022-07-11 NOTE — PROGRESS NOTES
"[facility]   Physical Therapy Treatment Note     Name: lEyssa Richards Jr.  Clinic Number: [id number]    Therapy Diagnosis:   Encounter Diagnoses   Name Primary?    Capsulitis     Equinus contracture of ankle     Foot pain, bilateral     Decreased range of motion of both ankles Yes     Physician: Errol Mansfield DPM    Visit Date: 7/11/2022    Physician Orders: PT Eval and Treat   Medical Diagnosis from Referral:   M77.9 (ICD-10-CM) - Capsulitis   M24.573 (ICD-10-CM) - Equinus contracture of ankle   M79.671,M79.672 (ICD-10-CM) - Foot pain, bilateral         Evaluation Date: 5/20/2022  Authorization Period Expiration: 12/31/2022  Plan of Care Expiration: 8/20/2022  Progress Note Due: 6/20/2022  Visit # / Visits authorized:  7/12  FOTO: 5/5 0/5    Precautions: Standard    Time In: 3:05 pm  Time Out: 3:50 pm  Total Billable Time: 45 minutes    Subjective     Pt reports: B feet feel better but still sore.  States some plantar numbness on L.  He was compliant with home exercise program.  Response to previous treatment: no adverse effects   Functional change: none at this time     Pain: 0/10  Location: n/a     Objective     R hip abductors 4-/5, L 4+/5.    Elyssa received therapeutic exercises to develop strength, endurance, ROM and flexibility for 30 minutes including:    Recumbent bike 5 min   LAQ 30x 3#  Clams RTB 30x each  Ulman transfers 2 min x 2  GSS on Wedge 2x30"  Standing arch doming 2x15  Heel raises 2x10  Leg press Calf raises eccentrics 2x10 2 cord, Unilateral 1c 1x10  Ankle 4 way GTB 25x ea   SLS on airex 3x30" BLE   SL hip abd  Prone GTB hip ext and diagonals B          Home Exercises Provided and Patient Education Provided     Education provided:   - HEP    Written Home Exercises Provided: yes.  Exercises were reviewed and Elyssa was able to demonstrate them prior to the end of the session.  Elyssa demonstrated good  understanding of the education provided.     See EMR under Patient " Instructions for exercises provided 5/24/2022.    Assessment     Pt has weak hip abductors B, R > L.  Tolerated there ex well.    Elyssa is progressing well towards his goals.   Pt prognosis is Good.     Pt will continue to benefit from skilled outpatient physical therapy to address the deficits listed in the problem list box on initial evaluation, provide pt/family education and to maximize pt's level of independence in the home and community environment.     Pt's spiritual, cultural and educational needs considered and pt agreeable to plan of care and goals.     Anticipated barriers to physical therapy: none    Goals:   Short Term Goals:     1. Pt will be independent with HEP supplement PT in improving functional mobility.  2. Pt will improve Bilateral ankle dorsiflexion PROM to at least 15 Deg in order to improve gait  3. Pt will improve Bilateral ankle eversion PROM to at least 15 Deg in order to improve gait     Long Term Goals:  1. Pt will be independent with updated HEP supplement PT in improving functional mobility.  2. Pt will improve Bilateral ankle dorsiflexion PROM to at least 20 Deg in order to improve gait  3. Pt will improve Bilateral ankle eversion PROM to at least 20 Deg in order to improve gait  4. Pt will report no pain with ambulation for 10 minutes or greater      Plan   Plan of care Certification: 5/20/2022 to 8/20/2022       Continue with current POC.       Torres Winters, PT

## 2022-10-10 ENCOUNTER — OFFICE VISIT (OUTPATIENT)
Dept: URGENT CARE | Facility: CLINIC | Age: 47
End: 2022-10-10
Payer: COMMERCIAL

## 2022-10-10 VITALS
OXYGEN SATURATION: 100 % | WEIGHT: 210 LBS | RESPIRATION RATE: 12 BRPM | BODY MASS INDEX: 29.4 KG/M2 | HEIGHT: 71 IN | SYSTOLIC BLOOD PRESSURE: 143 MMHG | TEMPERATURE: 99 F | HEART RATE: 88 BPM | DIASTOLIC BLOOD PRESSURE: 71 MMHG

## 2022-10-10 DIAGNOSIS — R11.10 VOMITING, UNSPECIFIED VOMITING TYPE, UNSPECIFIED WHETHER NAUSEA PRESENT: Primary | ICD-10-CM

## 2022-10-10 LAB
CTP QC/QA: YES
CTP QC/QA: YES
FLUAV AG NPH QL: NEGATIVE
FLUBV AG NPH QL: NEGATIVE
SARS-COV-2 RDRP RESP QL NAA+PROBE: NEGATIVE

## 2022-10-10 PROCEDURE — 87635: ICD-10-PCS | Mod: QW,S$GLB,, | Performed by: NURSE PRACTITIONER

## 2022-10-10 PROCEDURE — 99214 OFFICE O/P EST MOD 30 MIN: CPT | Mod: 25,S$GLB,, | Performed by: NURSE PRACTITIONER

## 2022-10-10 PROCEDURE — 87804 INFLUENZA ASSAY W/OPTIC: CPT | Mod: QW,S$GLB,, | Performed by: NURSE PRACTITIONER

## 2022-10-10 PROCEDURE — 1159F MED LIST DOCD IN RCRD: CPT | Mod: CPTII,S$GLB,, | Performed by: NURSE PRACTITIONER

## 2022-10-10 PROCEDURE — 3078F DIAST BP <80 MM HG: CPT | Mod: CPTII,S$GLB,, | Performed by: NURSE PRACTITIONER

## 2022-10-10 PROCEDURE — 1160F PR REVIEW ALL MEDS BY PRESCRIBER/CLIN PHARMACIST DOCUMENTED: ICD-10-PCS | Mod: CPTII,S$GLB,, | Performed by: NURSE PRACTITIONER

## 2022-10-10 PROCEDURE — 3008F PR BODY MASS INDEX (BMI) DOCUMENTED: ICD-10-PCS | Mod: CPTII,S$GLB,, | Performed by: NURSE PRACTITIONER

## 2022-10-10 PROCEDURE — 87804 POCT INFLUENZA A/B: ICD-10-PCS | Mod: QW,S$GLB,, | Performed by: NURSE PRACTITIONER

## 2022-10-10 PROCEDURE — 1159F PR MEDICATION LIST DOCUMENTED IN MEDICAL RECORD: ICD-10-PCS | Mod: CPTII,S$GLB,, | Performed by: NURSE PRACTITIONER

## 2022-10-10 PROCEDURE — 99214 PR OFFICE/OUTPT VISIT, EST, LEVL IV, 30-39 MIN: ICD-10-PCS | Mod: 25,S$GLB,, | Performed by: NURSE PRACTITIONER

## 2022-10-10 PROCEDURE — 3077F SYST BP >= 140 MM HG: CPT | Mod: CPTII,S$GLB,, | Performed by: NURSE PRACTITIONER

## 2022-10-10 PROCEDURE — 87635 SARS-COV-2 COVID-19 AMP PRB: CPT | Mod: QW,S$GLB,, | Performed by: NURSE PRACTITIONER

## 2022-10-10 PROCEDURE — 1160F RVW MEDS BY RX/DR IN RCRD: CPT | Mod: CPTII,S$GLB,, | Performed by: NURSE PRACTITIONER

## 2022-10-10 PROCEDURE — 3078F PR MOST RECENT DIASTOLIC BLOOD PRESSURE < 80 MM HG: ICD-10-PCS | Mod: CPTII,S$GLB,, | Performed by: NURSE PRACTITIONER

## 2022-10-10 PROCEDURE — 3077F PR MOST RECENT SYSTOLIC BLOOD PRESSURE >= 140 MM HG: ICD-10-PCS | Mod: CPTII,S$GLB,, | Performed by: NURSE PRACTITIONER

## 2022-10-10 PROCEDURE — 3008F BODY MASS INDEX DOCD: CPT | Mod: CPTII,S$GLB,, | Performed by: NURSE PRACTITIONER

## 2022-10-10 RX ORDER — ONDANSETRON 4 MG/1
4 TABLET, ORALLY DISINTEGRATING ORAL EVERY 8 HOURS PRN
Qty: 12 TABLET | Refills: 0 | Status: SHIPPED | OUTPATIENT
Start: 2022-10-10

## 2022-10-10 NOTE — PROGRESS NOTES
"Subjective:       Patient ID: Elyssa Richards Jr. is a 47 y.o. male.    Vitals:  height is 5' 11" (1.803 m) and weight is 95.3 kg (210 lb). His temperature is 98.5 °F (36.9 °C). His blood pressure is 143/71 (abnormal) and his pulse is 88. His respiration is 12 and oxygen saturation is 100%.     Chief Complaint: Emesis (Began yesterday 10/9/22)    Pt complains of vomiting and possible fever since yesterday. Pt has requested a Flu and COVID swab. CT    Emesis   This is a new problem. The current episode started yesterday. The problem occurs less than 2 times per day. The problem has been waxing and waning. There has been no fever. Associated symptoms include chills, diarrhea and sweats. Pertinent negatives include no abdominal pain. He has tried acetaminophen and anti-emetic for the symptoms. The treatment provided no relief.     Constitution: Positive for chills.   Gastrointestinal:  Positive for vomiting and diarrhea. Negative for abdominal pain and constipation.     Objective:      Physical Exam   Constitutional: He is oriented to person, place, and time. He appears well-developed.   HENT:   Head: Normocephalic and atraumatic.   Ears:   Right Ear: External ear normal.   Left Ear: External ear normal.   Nose: Nose normal.   Mouth/Throat: Mucous membranes are normal.   Moist mucous membranes      Comments: Moist mucous membranes  Eyes: Conjunctivae and lids are normal.   Neck: Trachea normal. Neck supple.   Cardiovascular: Normal rate, regular rhythm and normal heart sounds.   Pulmonary/Chest: Effort normal and breath sounds normal. No respiratory distress.   Abdominal: Normal appearance. He exhibits no distension and no mass. Soft. Bowel sounds are increased. There is no abdominal tenderness. There is no rebound, no guarding, no left CVA tenderness and no right CVA tenderness.   Musculoskeletal: Normal range of motion.         General: Normal range of motion.   Neurological: He is alert and oriented to person, place, " and time. He has normal strength.   Skin: Skin is warm, dry, intact, not diaphoretic and not pale.   Psychiatric: His speech is normal and behavior is normal. Judgment and thought content normal.   Nursing note and vitals reviewed.      Results for orders placed or performed in visit on 10/10/22   POCT COVID-19 Rapid Screening   Result Value Ref Range    POC Rapid COVID Negative Negative     Acceptable Yes    POCT Influenza A/B   Result Value Ref Range    Rapid Influenza A Ag Negative Negative    Rapid Influenza B Ag Negative Negative     Acceptable Yes        Assessment:       1. Vomiting, unspecified vomiting type, unspecified whether nausea present          Plan:         Vomiting, unspecified vomiting type, unspecified whether nausea present  -     POCT COVID-19 Rapid Screening  -     POCT Influenza A/B  -     ondansetron (ZOFRAN-ODT) 4 MG TbDL; Take 1 tablet (4 mg total) by mouth every 8 (eight) hours as needed (nausea).  Dispense: 12 tablet; Refill: 0                 Patient Instructions   PLEASE READ YOUR DISCHARGE INSTRUCTIONS ENTIRELY AS IT CONTAINS IMPORTANT INFORMATION.     Take the zofran for nausea (it dissolves under your tongue)      Use gatorade/pedialyte or rehydration packets to help stay hydrated. Vitamin water and plain water do not contain rehydrating electrolytes.  Increase clear liquids (water, gatorade, pedialyte, broths, jello, etc) Hold off on solids for 12-18 hours. Then advance to BRAT diet (banana, rice, applesauce, tea, toast/crackers), then advance further as tolerated. Avoid spicy or fatty foods.   Use Peptobismol to help alleviate your diarrhea symptoms.      Avoid imodium unless you have more than 6 loose stools in 24 hours.        Wash hands frequently while sick. Avoid ibuprofen or other NSAIDS until you are well.      Please go to the ER if you experience worsening pain, blood in your vomit or stool, high fever, dizziness, fainting, swelling of your  abdomen, inability to pass gas or stool.      You must understand that you have received an Urgent Care treatment only and that you may be released before all of your medical problems are known or treated.

## 2022-10-10 NOTE — PATIENT INSTRUCTIONS
PLEASE READ YOUR DISCHARGE INSTRUCTIONS ENTIRELY AS IT CONTAINS IMPORTANT INFORMATION.     Take the zofran for nausea (it dissolves under your tongue)      Use gatorade/pedialyte or rehydration packets to help stay hydrated. Vitamin water and plain water do not contain rehydrating electrolytes.  Increase clear liquids (water, gatorade, pedialyte, broths, jello, etc) Hold off on solids for 12-18 hours. Then advance to BRAT diet (banana, rice, applesauce, tea, toast/crackers), then advance further as tolerated. Avoid spicy or fatty foods.   Use Peptobismol to help alleviate your diarrhea symptoms.      Avoid imodium unless you have more than 6 loose stools in 24 hours.        Wash hands frequently while sick. Avoid ibuprofen or other NSAIDS until you are well.      Please go to the ER if you experience worsening pain, blood in your vomit or stool, high fever, dizziness, fainting, swelling of your abdomen, inability to pass gas or stool.      You must understand that you have received an Urgent Care treatment only and that you may be released before all of your medical problems are known or treated.

## 2023-07-15 ENCOUNTER — OFFICE VISIT (OUTPATIENT)
Dept: URGENT CARE | Facility: CLINIC | Age: 48
End: 2023-07-15
Payer: COMMERCIAL

## 2023-07-15 VITALS
HEIGHT: 71 IN | RESPIRATION RATE: 16 BRPM | TEMPERATURE: 99 F | SYSTOLIC BLOOD PRESSURE: 144 MMHG | WEIGHT: 224 LBS | HEART RATE: 80 BPM | OXYGEN SATURATION: 98 % | BODY MASS INDEX: 31.36 KG/M2 | DIASTOLIC BLOOD PRESSURE: 97 MMHG

## 2023-07-15 DIAGNOSIS — R05.9 COUGH, UNSPECIFIED TYPE: ICD-10-CM

## 2023-07-15 DIAGNOSIS — J20.8 ACUTE BRONCHITIS, VIRAL: Primary | ICD-10-CM

## 2023-07-15 LAB
CTP QC/QA: YES
SARS-COV-2 AG RESP QL IA.RAPID: NEGATIVE

## 2023-07-15 PROCEDURE — 99213 PR OFFICE/OUTPT VISIT, EST, LEVL III, 20-29 MIN: ICD-10-PCS | Mod: S$GLB,,, | Performed by: NURSE PRACTITIONER

## 2023-07-15 PROCEDURE — 87811 SARS-COV-2 COVID19 W/OPTIC: CPT | Mod: QW,S$GLB,, | Performed by: NURSE PRACTITIONER

## 2023-07-15 PROCEDURE — 99213 OFFICE O/P EST LOW 20 MIN: CPT | Mod: S$GLB,,, | Performed by: NURSE PRACTITIONER

## 2023-07-15 PROCEDURE — 87811 SARS CORONAVIRUS 2 ANTIGEN POCT, MANUAL READ: ICD-10-PCS | Mod: QW,S$GLB,, | Performed by: NURSE PRACTITIONER

## 2023-07-15 RX ORDER — PROMETHAZINE HYDROCHLORIDE AND DEXTROMETHORPHAN HYDROBROMIDE 6.25; 15 MG/5ML; MG/5ML
5 SYRUP ORAL NIGHTLY PRN
Qty: 120 ML | Refills: 0 | Status: SHIPPED | OUTPATIENT
Start: 2023-07-15 | End: 2023-07-25

## 2023-07-15 RX ORDER — BENZONATATE 200 MG/1
200 CAPSULE ORAL 3 TIMES DAILY PRN
Qty: 30 CAPSULE | Refills: 0 | Status: SHIPPED | OUTPATIENT
Start: 2023-07-15 | End: 2023-07-25

## 2023-07-15 NOTE — PROGRESS NOTES
"Subjective:      Patient ID: Elyssa Richards Jr. is a 48 y.o. male.    Vitals:  height is 5' 11" (1.803 m) and weight is 101.6 kg (223 lb 15.8 oz). His oral temperature is 99.2 °F (37.3 °C). His blood pressure is 144/97 (abnormal) and his pulse is 80. His respiration is 16 and oxygen saturation is 98%.     Chief Complaint: Cough (Entered by patient)    Pt presents dry coughing fits that started yesterday. Pt had a head cold and sinus pressure earlier in the week . Pt has tried mucinex without any relief.  Hx of pneumonia and says that this does not feel similar to pneumonia.    Cough  The cough is Non-productive. Associated symptoms include wheezing. Pertinent negatives include no chest pain, chills, ear congestion, ear pain, fever, headaches, heartburn, hemoptysis, myalgias, nasal congestion, postnasal drip, rash, rhinorrhea, sore throat, shortness of breath, sweats or weight loss. Treatments tried: mucinex and vicks vapor rub. The treatment provided no relief.     Constitution: Negative for chills and fever.   HENT:  Negative for ear pain, postnasal drip and sore throat.    Cardiovascular:  Negative for chest pain.   Respiratory:  Positive for cough and wheezing. Negative for bloody sputum and shortness of breath.    Gastrointestinal:  Negative for heartburn.   Musculoskeletal:  Negative for muscle ache.   Skin:  Negative for rash.   Neurological:  Negative for headaches.    Objective:     Physical Exam   Constitutional: He is oriented to person, place, and time. He appears well-developed. He is cooperative.  Non-toxic appearance. He does not appear ill. No distress.   HENT:   Head: Normocephalic and atraumatic.   Ears:   Right Ear: Hearing, tympanic membrane, external ear and ear canal normal.   Left Ear: Hearing, tympanic membrane, external ear and ear canal normal.   Nose: Mucosal edema and rhinorrhea present. No purulent discharge or nasal deformity. No epistaxis. Right sinus exhibits no maxillary sinus " tenderness and no frontal sinus tenderness. Left sinus exhibits no maxillary sinus tenderness and no frontal sinus tenderness.   Mouth/Throat: Uvula is midline, oropharynx is clear and moist and mucous membranes are normal. No trismus in the jaw. Normal dentition. No uvula swelling. No oropharyngeal exudate, posterior oropharyngeal edema or posterior oropharyngeal erythema.   Eyes: Conjunctivae and lids are normal. No scleral icterus.   Neck: Trachea normal and phonation normal. Neck supple. No edema present. No erythema present. No neck rigidity present.   Cardiovascular: Normal rate, regular rhythm, normal heart sounds and normal pulses.   Pulmonary/Chest: Effort normal and breath sounds normal. No respiratory distress. He has no decreased breath sounds. He has no wheezes. He has no rhonchi.   Abdominal: Normal appearance.   Musculoskeletal: Normal range of motion.         General: No deformity. Normal range of motion.   Neurological: He is alert and oriented to person, place, and time. He exhibits normal muscle tone. Coordination normal.   Skin: Skin is warm, dry, intact, not diaphoretic and not pale.   Psychiatric: His speech is normal and behavior is normal. Judgment and thought content normal.   Nursing note and vitals reviewed.    Assessment:     1. Acute bronchitis, viral    2. Cough, unspecified type        Plan:     Results for orders placed or performed in visit on 07/15/23   SARS Coronavirus 2 Antigen, POCT Manual Read   Result Value Ref Range    SARS Coronavirus 2 Antigen Negative Negative     Acceptable Yes        Acute bronchitis, viral  -     promethazine-dextromethorphan (PROMETHAZINE-DM) 6.25-15 mg/5 mL Syrp; Take 5 mLs by mouth nightly as needed (cough).  Dispense: 120 mL; Refill: 0  -     benzonatate (TESSALON) 200 MG capsule; Take 1 capsule (200 mg total) by mouth 3 (three) times daily as needed for Cough.  Dispense: 30 capsule; Refill: 0    Cough, unspecified type  -     SARS  Coronavirus 2 Antigen, POCT Manual Read  -     promethazine-dextromethorphan (PROMETHAZINE-DM) 6.25-15 mg/5 mL Syrp; Take 5 mLs by mouth nightly as needed (cough).  Dispense: 120 mL; Refill: 0  -     benzonatate (TESSALON) 200 MG capsule; Take 1 capsule (200 mg total) by mouth 3 (three) times daily as needed for Cough.  Dispense: 30 capsule; Refill: 0      Patient Instructions   Please drink plenty of fluids.  Please get plenty of rest.  Please return here or go to the Emergency Department for any concerns or worsening of condition.  If you do not have Hypertension or any history of palpitations, it is ok to take over the counter Sudafed or Mucinex D or Allegra-D or Claritin-D or Zyrtec-D.  If you do take one of the above, it is ok to combine that with plain over the counter Mucinex or Allegra or Claritin or Zyrtec.  If for example you are taking Zyrtec -D, you can combine that with Mucinex, but not Mucinex-D.  If you are taking Mucinex-D, you can combine that with plain Allegra or Claritin or Zyrtec.   If you do have Hypertension or palpitations, it is safe to take Coricidin HBP for relief of sinus symptoms.  If not allergic, please take over the counter Tylenol (Acetaminophen) and/or Motrin (Ibuprofen) as directed for control of pain and/or fever.  Please follow up with your primary care doctor or specialist as needed.    If you  smoke, please stop smoking.

## 2024-07-10 ENCOUNTER — OFFICE VISIT (OUTPATIENT)
Dept: URGENT CARE | Facility: CLINIC | Age: 49
End: 2024-07-10
Payer: COMMERCIAL

## 2024-07-10 VITALS
SYSTOLIC BLOOD PRESSURE: 157 MMHG | DIASTOLIC BLOOD PRESSURE: 108 MMHG | HEIGHT: 71 IN | TEMPERATURE: 99 F | HEART RATE: 78 BPM | RESPIRATION RATE: 17 BRPM | OXYGEN SATURATION: 97 % | BODY MASS INDEX: 33.34 KG/M2 | WEIGHT: 238.13 LBS

## 2024-07-10 DIAGNOSIS — S61.219A SUPERFICIAL LACERATION OF FINGER: Primary | ICD-10-CM

## 2024-07-10 PROCEDURE — 90471 IMMUNIZATION ADMIN: CPT | Mod: S$GLB,,,

## 2024-07-10 PROCEDURE — 90715 TDAP VACCINE 7 YRS/> IM: CPT | Mod: S$GLB,,,

## 2024-07-10 PROCEDURE — 99213 OFFICE O/P EST LOW 20 MIN: CPT | Mod: 25,S$GLB,,

## 2024-07-10 RX ORDER — MUPIROCIN 20 MG/G
OINTMENT TOPICAL DAILY
Status: SHIPPED | OUTPATIENT
Start: 2024-07-10 | End: 2024-07-11

## 2024-07-10 NOTE — PROGRESS NOTES
"Subjective:      Patient ID: Elyssa Richards Jr. is a 49 y.o. male.    Vitals:  height is 5' 11" (1.803 m) and weight is 108 kg (238 lb 1.6 oz). His oral temperature is 99 °F (37.2 °C). His blood pressure is 157/108 (abnormal) and his pulse is 78. His respiration is 17 and oxygen saturation is 97%.     Chief Complaint: Laceration    Pt presents today w/ left middle finger laceration ; incident occurred approx. 1 hour PTA. Pt reports he was slicing an onion with a clean knife and cut his finger on side of left middle finger.  Laceration bled initially, no active bleeding in clinic. Pt denies loss of ROM or sensation.  Pt last tdap is out of date, patient estimates it has been 20 years.   No other complaints, concerns, or injuries.    Laceration   The incident occurred less than 1 hour ago. The laceration is 1 cm in size. The laceration mechanism was a clean knife. The pain is at a severity of 0/10. The patient is experiencing no pain. The pain has been Constant since onset. He reports no foreign bodies present. His tetanus status is out of date.       Constitution: Negative for fever and generalized weakness.   HENT:  Negative for ear pain, sinus pain and sore throat.    Neck: Negative for neck pain.   Cardiovascular:  Negative for chest pain.   Respiratory:  Negative for cough and shortness of breath.    Gastrointestinal:  Negative for abdominal pain.   Skin:  Positive for laceration (left middle finger).   Neurological:  Negative for headaches.      Objective:     Physical Exam   Constitutional: He is oriented to person, place, and time. He appears well-developed. He is cooperative.  Non-toxic appearance. He does not appear ill. No distress.   HENT:   Head: Normocephalic and atraumatic.   Ears:   Right Ear: Hearing, tympanic membrane, external ear and ear canal normal.   Left Ear: Hearing, tympanic membrane, external ear and ear canal normal.   Nose: Nose normal. No mucosal edema, rhinorrhea or nasal deformity. No " epistaxis. Right sinus exhibits no maxillary sinus tenderness and no frontal sinus tenderness. Left sinus exhibits no maxillary sinus tenderness and no frontal sinus tenderness.   Mouth/Throat: Uvula is midline, oropharynx is clear and moist and mucous membranes are normal. No trismus in the jaw. Normal dentition. No uvula swelling. No oropharyngeal exudate, posterior oropharyngeal edema or posterior oropharyngeal erythema.   Eyes: Conjunctivae and lids are normal. No scleral icterus.   Neck: Trachea normal and phonation normal. Neck supple. No edema present. No erythema present. No neck rigidity present.   Cardiovascular: Normal rate, regular rhythm, normal heart sounds and normal pulses.   Pulmonary/Chest: Effort normal and breath sounds normal. No respiratory distress. He has no decreased breath sounds. He has no rhonchi.   Abdominal: Normal appearance and bowel sounds are normal. Soft.   Musculoskeletal: Normal range of motion.         General: No deformity. Normal range of motion.      Left hand: He exhibits laceration.   Neurological: He is alert and oriented to person, place, and time. He exhibits normal muscle tone. Coordination normal.   Skin: Skin is warm, dry, intact, not diaphoretic and not pale. Capillary refill takes less than 2 seconds. Lacerations - upper ext.:  left hand  Psychiatric: His speech is normal and behavior is normal. Judgment and thought content normal.   Nursing note and vitals reviewed.      Assessment:     1. Superficial laceration of finger        Plan:   Patient has superficial laceration to left middle finger, no active bleeding.  No indication for sutures, offered skin glue, however patient declined and opted for band aid.  Tdap given and Bactroban applied to laceration before Band-Aid applied.  Discussed home wound care and cleaning instructions, he acknowledges understanding.     Superficial laceration of finger    Other orders  -     (In Office Administered) Tdap Vaccine  -      mupirocin 2 % ointment      Patient Instructions   Laceration Home Care Instructions     Tylenol or Motrin for pain     Keep your dressing on for 24 hours. Do not wet laceration for 12 hours.  After 24 hours remove the dressing and gently clean wound with soap and water at least twice daily unless more frequently soiled, then clean more frequently.    May apply topical antibiotic such as Mupirocin to wound to promote healing.   Clean old antibiotic ointment away before new application.      Signs of infection include:  Increase in redness, increase in pain, purulent (pus) drainage. Contact clinic if infection concerns arise.     Keep covered when you are out and about, if the dressing becomes wet, change it immediately. Keep open to air when at home.

## 2024-07-10 NOTE — PATIENT INSTRUCTIONS
Laceration Home Care Instructions     Tylenol or Motrin for pain     Keep your dressing on for 24 hours. Do not wet laceration for 12 hours.  After 24 hours remove the dressing and gently clean wound with soap and water at least twice daily unless more frequently soiled, then clean more frequently.    May apply topical antibiotic such as Mupirocin to wound to promote healing.   Clean old antibiotic ointment away before new application.      Signs of infection include:  Increase in redness, increase in pain, purulent (pus) drainage. Contact clinic if infection concerns arise.     Keep covered when you are out and about, if the dressing becomes wet, change it immediately. Keep open to air when at home.

## 2024-10-11 ENCOUNTER — OCCUPATIONAL HEALTH (OUTPATIENT)
Dept: URGENT CARE | Facility: CLINIC | Age: 49
End: 2024-10-11

## 2024-10-11 DIAGNOSIS — Z00.00 PHYSICAL EXAM: Primary | ICD-10-CM

## 2024-10-28 ENCOUNTER — OCCUPATIONAL HEALTH (OUTPATIENT)
Dept: URGENT CARE | Facility: CLINIC | Age: 49
End: 2024-10-28

## 2024-10-28 DIAGNOSIS — Z02.83 ENCOUNTER FOR DRUG SCREENING: Primary | ICD-10-CM

## 2025-01-06 ENCOUNTER — E-VISIT (OUTPATIENT)
Dept: URGENT CARE | Facility: CLINIC | Age: 50
End: 2025-01-06
Payer: COMMERCIAL

## 2025-01-06 DIAGNOSIS — G47.00 INSOMNIA, UNSPECIFIED TYPE: Primary | ICD-10-CM

## 2025-01-06 RX ORDER — TRAZODONE HYDROCHLORIDE 50 MG/1
50 TABLET ORAL NIGHTLY
Qty: 30 TABLET | Refills: 0 | Status: SHIPPED | OUTPATIENT
Start: 2025-01-06

## 2025-01-06 NOTE — PROGRESS NOTES
Patient ID: Elyssa Richards Jr. is a 49 y.o. male.    Chief Complaint: General Illness (Entered automatically based on patient selection in [employer].)    The patient initiated a request through [employer] on 1/6/2025 for evaluation and management with a chief complaint of General Illness (Entered automatically based on patient selection in [employer].)     I evaluated the questionnaire submission on 1/6/25.    [facility] Peq Evisit [facility]-Common Problems    1/6/2025  1:01 PM CST - Filed by Patient   What do you need help with? Other Concern   Do you agree to participate in an E-Visit? Yes   If you have any of the following symptoms, please present to your local emergency room or call 911:  I acknowledge   What is the main issue you would like addressed today? Not sleeping   Please describe your symptoms Having trounle falling asleep. No over tge vijnter suplements are helping   Where is your problem located? Unable to fall asleep   How severe are your symptoms? Moderate   Have you had these symptoms before? Yes   How long have you been having these symptoms? For a few days   Please list any medications or treatments you have used for your condition and indicate if it was effective or not. Melitonin, zquil no help   What makes this feel better? Nothing   What makes this feel worse? Nothing   Are these symptoms related to a condition that you currently have? Yes   What is the condition? Not sleeping   When were you last seen for this condition?    Please describe any probable cause for these symptoms Stopped drinking   Provide any additional information you feel is important.    Please attach any relevant images or files    Are you able to take your vital signs? No         Encounter Diagnosis   Name Primary?    Insomnia, unspecified type Yes        No orders of the defined types were placed in this encounter.     Medications Ordered This Encounter   Medications    traZODone (DESYREL) 50 MG tablet     Sig: Take 1 tablet (50 mg  total) by mouth every evening.     Dispense:  30 tablet     Refill:  0        No follow-ups on file.      E-Visit Time Tracking: